# Patient Record
Sex: MALE | Race: WHITE | RURAL
[De-identification: names, ages, dates, MRNs, and addresses within clinical notes are randomized per-mention and may not be internally consistent; named-entity substitution may affect disease eponyms.]

---

## 2021-08-28 ENCOUNTER — APPOINTMENT (OUTPATIENT)
Dept: GENERAL RADIOLOGY | Facility: HOSPITAL | Age: 51
End: 2021-08-28

## 2021-08-28 ENCOUNTER — HOSPITAL ENCOUNTER (EMERGENCY)
Facility: HOSPITAL | Age: 51
Discharge: HOME | End: 2021-08-29
Attending: EMERGENCY MEDICINE

## 2021-08-28 DIAGNOSIS — U07.1 COVID-19: Primary | ICD-10-CM

## 2021-08-28 DIAGNOSIS — J44.1 COPD EXACERBATION (HCC): ICD-10-CM

## 2021-08-28 DIAGNOSIS — R05.9 COUGH: ICD-10-CM

## 2021-08-28 DIAGNOSIS — S29.011A INTERCOSTAL MUSCLE STRAIN, INITIAL ENCOUNTER: ICD-10-CM

## 2021-08-28 LAB
A/G RATIO: 1.2 (ref 0.8–2)
ALBUMIN SERPL-MCNC: 4.3 G/DL (ref 3.4–4.8)
ALP BLD-CCNC: 88 U/L (ref 25–100)
ALT SERPL-CCNC: 44 U/L (ref 4–36)
ANION GAP SERPL CALCULATED.3IONS-SCNC: 14 MMOL/L (ref 3–16)
AST SERPL-CCNC: 52 U/L (ref 8–33)
BASOPHILS ABSOLUTE: 0.1 K/UL (ref 0–0.1)
BASOPHILS RELATIVE PERCENT: 1 %
BILIRUB SERPL-MCNC: 0.6 MG/DL (ref 0.3–1.2)
BUN BLDV-MCNC: 6 MG/DL (ref 6–20)
CALCIUM SERPL-MCNC: 8.9 MG/DL (ref 8.5–10.5)
CHLORIDE BLD-SCNC: 96 MMOL/L (ref 98–107)
CO2: 24 MMOL/L (ref 20–30)
CREAT SERPL-MCNC: 0.8 MG/DL (ref 0.4–1.2)
EOSINOPHILS ABSOLUTE: 0.2 K/UL (ref 0–0.4)
EOSINOPHILS RELATIVE PERCENT: 3.1 %
GFR AFRICAN AMERICAN: >59
GFR NON-AFRICAN AMERICAN: >59
GLOBULIN: 3.5 G/DL
GLUCOSE BLD-MCNC: 313 MG/DL (ref 74–106)
HCT VFR BLD CALC: 46.3 % (ref 40–54)
HEMOGLOBIN: 16.1 G/DL (ref 13–18)
IMMATURE GRANULOCYTES #: 0 K/UL
IMMATURE GRANULOCYTES %: 0.3 % (ref 0–5)
LYMPHOCYTES ABSOLUTE: 0.6 K/UL (ref 1.5–4)
LYMPHOCYTES RELATIVE PERCENT: 10.4 %
MCH RBC QN AUTO: 33.4 PG (ref 27–32)
MCHC RBC AUTO-ENTMCNC: 34.8 G/DL (ref 31–35)
MCV RBC AUTO: 96.1 FL (ref 80–100)
MONOCYTES ABSOLUTE: 0.7 K/UL (ref 0.2–0.8)
MONOCYTES RELATIVE PERCENT: 11.1 %
NEUTROPHILS ABSOLUTE: 4.5 K/UL (ref 2–7.5)
NEUTROPHILS RELATIVE PERCENT: 74.1 %
PDW BLD-RTO: 11.1 % (ref 11–16)
PLATELET # BLD: 175 K/UL (ref 150–400)
PMV BLD AUTO: 10 FL (ref 6–10)
POTASSIUM SERPL-SCNC: 4.2 MMOL/L (ref 3.4–5.1)
RBC # BLD: 4.82 M/UL (ref 4.5–6)
SARS-COV-2, NAAT: DETECTED
SODIUM BLD-SCNC: 134 MMOL/L (ref 136–145)
TOTAL PROTEIN: 7.8 G/DL (ref 6.4–8.3)
TROPONIN: <0.3 NG/ML
WBC # BLD: 6.1 K/UL (ref 4–11)

## 2021-08-28 PROCEDURE — 87635 SARS-COV-2 COVID-19 AMP PRB: CPT

## 2021-08-28 PROCEDURE — 6360000002 HC RX W HCPCS: Performed by: EMERGENCY MEDICINE

## 2021-08-28 PROCEDURE — 85025 COMPLETE CBC W/AUTO DIFF WBC: CPT

## 2021-08-28 PROCEDURE — 36415 COLL VENOUS BLD VENIPUNCTURE: CPT

## 2021-08-28 PROCEDURE — 80053 COMPREHEN METABOLIC PANEL: CPT

## 2021-08-28 PROCEDURE — 96374 THER/PROPH/DIAG INJ IV PUSH: CPT

## 2021-08-28 PROCEDURE — 71045 X-RAY EXAM CHEST 1 VIEW: CPT

## 2021-08-28 PROCEDURE — 99283 EMERGENCY DEPT VISIT LOW MDM: CPT

## 2021-08-28 PROCEDURE — 84484 ASSAY OF TROPONIN QUANT: CPT

## 2021-08-28 PROCEDURE — 93005 ELECTROCARDIOGRAM TRACING: CPT

## 2021-08-28 PROCEDURE — 6370000000 HC RX 637 (ALT 250 FOR IP): Performed by: EMERGENCY MEDICINE

## 2021-08-28 RX ORDER — PREDNISONE 10 MG/1
TABLET ORAL
Status: DISCONTINUED
Start: 2021-08-28 | End: 2021-08-29 | Stop reason: HOSPADM

## 2021-08-28 RX ORDER — BENZONATATE 100 MG/1
200 CAPSULE ORAL 3 TIMES DAILY PRN
Status: DISCONTINUED | OUTPATIENT
Start: 2021-08-28 | End: 2021-08-29 | Stop reason: HOSPADM

## 2021-08-28 RX ORDER — BENZONATATE 200 MG/1
200 CAPSULE ORAL 3 TIMES DAILY PRN
Qty: 21 CAPSULE | Refills: 0 | Status: SHIPPED | OUTPATIENT
Start: 2021-08-28 | End: 2021-09-04

## 2021-08-28 RX ORDER — PREDNISONE 50 MG/1
TABLET ORAL
Status: DISCONTINUED
Start: 2021-08-28 | End: 2021-08-29 | Stop reason: HOSPADM

## 2021-08-28 RX ORDER — BENZONATATE 100 MG/1
200 CAPSULE ORAL ONCE
Status: COMPLETED | OUTPATIENT
Start: 2021-08-28 | End: 2021-08-28

## 2021-08-28 RX ORDER — PREDNISONE 20 MG/1
60 TABLET ORAL DAILY
Qty: 15 TABLET | Refills: 0 | Status: SHIPPED | OUTPATIENT
Start: 2021-08-28 | End: 2021-09-02

## 2021-08-28 RX ORDER — ALBUTEROL SULFATE 90 UG/1
2 AEROSOL, METERED RESPIRATORY (INHALATION) 4 TIMES DAILY PRN
Qty: 1 INHALER | Refills: 0 | Status: SHIPPED | OUTPATIENT
Start: 2021-08-28

## 2021-08-28 RX ORDER — ALBUTEROL SULFATE 90 UG/1
2 AEROSOL, METERED RESPIRATORY (INHALATION) EVERY 6 HOURS PRN
Status: DISCONTINUED | OUTPATIENT
Start: 2021-08-28 | End: 2021-08-29 | Stop reason: HOSPADM

## 2021-08-28 RX ORDER — METHYLPREDNISOLONE SODIUM SUCCINATE 125 MG/2ML
125 INJECTION, POWDER, LYOPHILIZED, FOR SOLUTION INTRAMUSCULAR; INTRAVENOUS ONCE
Status: COMPLETED | OUTPATIENT
Start: 2021-08-28 | End: 2021-08-28

## 2021-08-28 RX ADMIN — BENZONATATE 200 MG: 100 CAPSULE ORAL at 22:31

## 2021-08-28 RX ADMIN — ALBUTEROL SULFATE 2 PUFF: 90 AEROSOL, METERED RESPIRATORY (INHALATION) at 22:30

## 2021-08-28 RX ADMIN — BENZONATATE 200 MG: 100 CAPSULE ORAL at 22:27

## 2021-08-28 RX ADMIN — METHYLPREDNISOLONE SODIUM SUCCINATE 125 MG: 125 INJECTION, POWDER, FOR SOLUTION INTRAMUSCULAR; INTRAVENOUS at 22:23

## 2021-08-28 ASSESSMENT — PAIN DESCRIPTION - PAIN TYPE: TYPE: ACUTE PAIN

## 2021-08-28 ASSESSMENT — PAIN SCALES - GENERAL: PAINLEVEL_OUTOF10: 2

## 2021-08-28 ASSESSMENT — PAIN DESCRIPTION - LOCATION: LOCATION: CHEST

## 2021-08-28 NOTE — ED TRIAGE NOTES
Pt states that he started having cough and nasal congestion three days ago. Pts states that his chest started hurting because of the coughing.

## 2021-08-29 VITALS
SYSTOLIC BLOOD PRESSURE: 160 MMHG | WEIGHT: 190 LBS | TEMPERATURE: 99.6 F | DIASTOLIC BLOOD PRESSURE: 94 MMHG | OXYGEN SATURATION: 96 % | HEART RATE: 82 BPM | RESPIRATION RATE: 18 BRPM | HEIGHT: 70 IN | BODY MASS INDEX: 27.2 KG/M2

## 2021-08-29 PROCEDURE — 6370000000 HC RX 637 (ALT 250 FOR IP): Performed by: EMERGENCY MEDICINE

## 2021-08-29 RX ADMIN — PREDNISONE 60 MG: 50 TABLET ORAL at 00:03

## 2021-08-29 NOTE — ED PROVIDER NOTES
[address] ENCOUNTER      Pt Name: Fide New  MRN: [id number]  [place]: 1970  Date of evaluation: 8/28/2021  Provider: Madonna Varner MD    [address] [place] [address]       Chief Complaint   Patient presents with    Cough    Chest Pain         HISTORY OF PRESENT ILLNESS  (Location/Symptom, Timing/Onset, Context/Setting, Quality, Duration, Modifying Factors, Severity.)   Fide New is a 52 y.o. male who presents to the emergency department with several complaints. Patient states that for the last several days he has had nonproductive cough with some mild body aches. Patient states that he works in [employer] is not sure if he had been exposed to COVID-19 or not. Patient reports a history of COPD and still continues to smoke. Patient states that he has been out of his inhaler and will buy them from people when available because he does not go to his primary physician. Patient states that he has not been using an inhaler recently. Patient states that the symptoms continued to progress and he was concerned so he presented to the emergency department for further evaluation. Patient states that he has pain in his rib muscles secondary to chronic cough. Patient also states that he has noted some increased wheezing. Last cigarette was prior to arrival.  Patient was resting comfortably in the room in no acute distress conversing in full sentences nontoxic      Nursing notes were reviewed.     REVIEW OFSYSTEMS    (2-9 systems for level 4, 10 or more for level 5)   ROS:  General: Body aches  Cardiovascular:  No chest pain, no palpitations  Respiratory: Cough and wheezing  Gastrointestinal:  No pain, no nausea, no vomiting, no diarrhea  Musculoskeletal:  No muscle pain, no joint pain  Skin:  No rash, no easy bruising  Neurologic:  No speech problems, no headache, no extremity weakness  Psychiatric:  No anxiety  Genitourinary:  No dysuria, no hematuria    Except as noted above the remainder of the review of systems was reviewed and negative. PAST MEDICAL HISTORY     Past Medical History:   Diagnosis Date    Asthma     [facility]ch[facility] [address]     Diabetes mellitus ([facility]     Hypertension          SURGICAL HISTORY       Past Surgical History:   Procedure Laterality Date    HERNIA REPAIR           CURRENT MEDICATIONS       Previous Medications    No medications on file       ALLERGIES     Patient has no known allergies. FAMILY HISTORY     History reviewed. No pertinent family history. SOCIAL HISTORY       Social History     Socioeconomic History    Marital status: Single     Spouse name: None    Number of children: None    Years of education: None    Highest education level: None   Occupational History    None   Tobacco Use    Smoking status: Current Every Day Smoker     Packs/day: 0.50     Types: Cigarettes    Smokeless tobacco: Never Used   Substance and Sexual Activity    Alcohol use: Never    Drug use: None    Sexual activity: None   Other Topics Concern    None   Social History Narrative    None     Social Determinants of Health     Financial Resource Strain:     Difficulty of Paying Living Expenses:    Food Insecurity:     Worried About Running Out of Food in the Last Year:     Ran Out of Food in the Last Year:    Transportation Needs:     Lack of Transportation (Medical):      Lack of Transportation (Non-Medical):    Physical Activity:     Days of Exercise per Week:     Minutes of Exercise per Session:    Stress:     Feeling of Stress :    Social Connections:     Frequency of Communication with Friends and Family:     Frequency of Social Gatherings with Friends and Family:     Attends Holiness Services:     Active Member of Clubs or Organizations:     Attends Club or Organization Meetings:     Marital Status:    Intimate Partner Violence:     Fear of Current or Ex-Partner:     Emotionally Abused:     Physically Abused:     Sexually Abused:          PHYSICAL EXAM    (up to 7 for level 4, 8 or more for level 5)     ED Triage Vitals [08/28/21 1946]   BP Temp Temp Source Pulse Resp SpO2 Height Weight   (!) 151/104 99.6 °F (37.6 °C) Oral 105 18 98 % 5' 10\" (1.778 m) 190 lb (86.2 kg)       Physical Exam  General :Patient is awake, alert, oriented, in no acute distress, nontoxic appearing  HEENT: Pupils are equally round and reactive to light, EOMI, conjunctivae clear. Oral mucosa is moist, no exudate. Uvula is midline  Neck: Neck is supple, full range of motion, trachea midline  Cardiac: Heart regular rate, rhythm, no murmurs, rubs, or gallops  Lungs: Regular respiratory rate. Bilateral wheezes. Chest wall: There is bilateral intercostal rib muscle tenderness on palpation recreates his symptoms of brought the patient to the emergency department  Abdomen: Abdomen is soft, nontender, nondistended. There is no firm or pulsatile masses, no rebound rigidity or guarding. Musculoskeletal: 5 out of 5 strength in all 4 extremities. No focal muscle deficits are appreciated  Neuro: Motor intact, sensory intact, level of consciousness is normal, cerebellar function is normal, reflexes are grossly normal. No evidence of incontinence or loss of bowel or bladder function, no saddle anesthesia noted   Dermatology: Skin is warm and dry  Psych: Mentation is grossly normal, cognition is grossly normal. Affect is appropriate.       DIAGNOSTIC RESULTS     EKG: All EKG's are interpreted by the Emergency Department Physician who either signs or Co-signs this chart in the [address] a cardiologist.    The EKG interpreted by me shows sinus tachycardia rate of 107 per EDMD.    RADIOLOGY:   Non-plain film images such as CT, Ultrasound and MRI are read by the radiologist. Plain radiographic images are visualized and preliminarily interpreted by the emergency physician with the below findings:      ? Radiologist's Report Reviewed:  XR CHEST PORTABLE   Final Result      1. Multifocal patchy airspace disease and small bilateral pleural effusions.             ED BEDSIDE ULTRASOUND:   Performed by ED Physician - none    LABS:    I have reviewed and interpreted all of the currently available lab results from this visit (ifapplicable):  Results for orders placed or performed during the hospital encounter of 08/28/21   COVID-19, Rapid    Specimen: Nasopharyngeal Swab   Result Value Ref Range    SARS-CoV-2, NAAT DETECTED (AA) Not Detected   CBC Auto Differential   Result Value Ref Range    WBC 6.1 4.0 - 11.0 K/uL    RBC 4.82 4.50 - 6.00 M/uL    Hemoglobin 16.1 13.0 - 18.0 g/dL    Hematocrit 46.3 40.0 - 54.0 %    MCV 96.1 80.0 - 100.0 fL    MCH 33.4 (H) 27.0 - 32.0 pg    MCHC 34.8 31.0 - 35.0 g/dL    RDW 11.1 11.0 - 16.0 %    Platelets 831 408 - 956 K/uL    MPV 10.0 6.0 - 10.0 fL    Neutrophils % 74.1 %    Immature Granulocytes % 0.3 0.0 - 5.0 %    Lymphocytes % 10.4 %    Monocytes % 11.1 %    Eosinophils % 3.1 %    Basophils % 1.0 %    Neutrophils Absolute 4.5 2.0 - 7.5 K/uL    Immature Granulocytes # 0.0 K/uL    Lymphocytes Absolute 0.6 (L) 1.5 - 4.0 K/uL    Monocytes Absolute 0.7 0.2 - 0.8 K/uL    Eosinophils Absolute 0.2 0.0 - 0.4 K/uL    Basophils Absolute 0.1 0.0 - 0.1 K/uL   Comprehensive Metabolic Panel   Result Value Ref Range    Sodium 134 (L) 136 - 145 mmol/L    Potassium 4.2 3.4 - 5.1 mmol/L    Chloride 96 (L) 98 - 107 mmol/L    CO2 24 20 - 30 mmol/L    Anion Gap 14 3 - 16    Glucose 313 (H) 74 - 106 mg/dL    BUN 6 6 - 20 mg/dL    CREATININE 0.8 0.4 - 1.2 mg/dL    GFR Non-African American >59 >59    GFR African American >59 >59    Calcium 8.9 8.5 - 10.5 mg/dL    Total Protein 7.8 6.4 - 8.3 g/dL    Albumin 4.3 3.4 - 4.8 g/dL    Albumin/Globulin Ratio 1.2 0.8 - 2.0    Total Bilirubin 0.6 0.3 - 1.2 mg/dL    Alkaline Phosphatase 88 25 - 100 U/L    ALT 44 (H) 4 - 36 U/L    AST 52 (H) 8 - 33 U/L    Globulin 3.5 g/dL   Troponin Result Value Ref Range    Troponin <0.30 <0.30 ng/mL        All other labs were within normal range or not returned as of this dictation. EMERGENCY DEPARTMENT COURSE and DIFFERENTIAL DIAGNOSIS/MDM:   Vitals:    Vitals:    08/28/21 2230 08/28/21 2245 08/28/21 2300 08/28/21 2315   BP:       Pulse: 102 100 87 89   Resp:       Temp:       TempSrc:       SpO2: 95% 97% 97% 95%   Weight:       Height:           MEDICATIONS ADMINISTERED IN ED:  Medications   predniSONE (DELTASONE) tablet 60 mg (has no administration in time range)   albuterol sulfate  (90 Base) MCG/ACT inhaler 2 puff (2 puffs Inhalation Given 8/28/21 2230)   benzonatate (TESSALON) capsule 200 mg (200 mg Oral Given 8/28/21 2231)   predniSONE (DELTASONE) 50 MG tablet (has no administration in time range)   predniSONE (DELTASONE) 10 MG tablet (has no administration in time range)   methylPREDNISolone sodium (SOLU-MEDROL) injection 125 mg (125 mg IntraVENous Given 8/28/21 2223)   benzonatate (TESSALON) capsule 200 mg (200 mg Oral Given 8/28/21 2227)       Patient was placed examination room in which time after exam was performed IV was obtained and labs were drawn. Patient was given Solu-Medrol 125 mg IV along with an MDI per respiratory secondary to COPD exacerbation with wheezes. EKG revealed sinus tachycardia rate of 107 per EDMD.  Review of patient's labs reveal a normal CMP. Troponin was negative. Glucose is 313 which patient states is baseline for his diabetes. White count was 6.1 thousand. Covid test was positive. Chest x-ray revealed patchy airspace disease with small bilateral pleural effusion. Patient was given a Tessalon 200 mg Perle for his cough. Results reviewed with patient who was instructed need to refrain from smoking, to use his inhaler and medications as prescribed. Patient was also instructed to quarantine himself for 10 days.   He is also instructed increase his clear liquid diet advance as tolerated and follow-up with his primary physician. Patient was appreciative the care and agrees with the plan above    The patient will follow-up with their PCP in 1-2 days for reevaluation. If the patient or family members have anyfurther concerns or any worsening symptoms they will return to the ED for reevaluation. CONSULTS:  None    PROCEDURES:  Procedures    CRITICAL CARE TIME    Total Critical Care time was 0 minutes, excluding separately reportable procedures. There was a high probability of clinically significant/life threatening deterioration in the patient's condition which required my urgent intervention. FINAL IMPRESSION      1. COVID-19 Stable   2. Cough Stable   3. Intercostal muscle strain, initial encounter Stable   4. COPD exacerbation ([facility] [address] Stable         DISPOSITION/PLAN   DISPOSITION        PATIENT REFERRED TO:  [facility] Emergency Department  [facility] [address] [facility]  [phone]  In 2 days  If symptoms worsen      Follow-up primary physician 1 to 2 days for reexamination  Schedule an appointment as soon as possible for a visit in 2 days        DISCHARGE MEDICATIONS:  New Prescriptions    ALBUTEROL SULFATE HFA (VENTOLIN HFA) 108 (90 BASE) MCG/ACT INHALER    Inhale 2 puffs into the lungs 4 times daily as needed for Wheezing    BENZONATATE (TESSALON) 200 MG CAPSULE    Take 1 capsule by mouth 3 times daily as needed for Cough    PREDNISONE (DELTASONE) 20 MG TABLET    Take 3 tablets by mouth daily for 5 days       Comment: Please note this report has been produced using speech recognition software and may contain errorsrelated to that system including errors in grammar, punctuation, and spelling, as well as words and phrases that may be inappropriate. If there are any questions or concerns please feel free to contact the dictating providerfor clarification.     Gregor Thomason MD  Attending Emergency Physician              Gregor Thomason MD  08/28/21 5488

## 2021-08-29 NOTE — ED NOTES
Discharge instructions reviewed and medications discussed with verbalized understanding from patient. Patient had no further questions or concerns.         Magalys Covarrubias RN  08/29/21 2547

## 2022-07-03 ENCOUNTER — APPOINTMENT (OUTPATIENT)
Dept: GENERAL RADIOLOGY | Facility: HOSPITAL | Age: 52
End: 2022-07-03

## 2022-07-03 ENCOUNTER — HOSPITAL ENCOUNTER (EMERGENCY)
Facility: HOSPITAL | Age: 52
Discharge: HOME | End: 2022-07-03
Attending: EMERGENCY MEDICINE

## 2022-07-03 VITALS
HEIGHT: 70 IN | OXYGEN SATURATION: 98 % | WEIGHT: 190 LBS | SYSTOLIC BLOOD PRESSURE: 163 MMHG | TEMPERATURE: 99.8 F | RESPIRATION RATE: 18 BRPM | HEART RATE: 114 BPM | DIASTOLIC BLOOD PRESSURE: 93 MMHG | BODY MASS INDEX: 27.2 KG/M2

## 2022-07-03 DIAGNOSIS — U07.1 UPPER RESPIRATORY TRACT INFECTION DUE TO COVID-19 VIRUS: Primary | ICD-10-CM

## 2022-07-03 DIAGNOSIS — J06.9 UPPER RESPIRATORY TRACT INFECTION DUE TO COVID-19 VIRUS: Primary | ICD-10-CM

## 2022-07-03 LAB
A/G RATIO: 1.3 (ref 0.8–2)
ALBUMIN SERPL-MCNC: 4.2 G/DL (ref 3.4–4.8)
ALP BLD-CCNC: 90 U/L (ref 25–100)
ALT SERPL-CCNC: 68 U/L (ref 4–36)
ANION GAP SERPL CALCULATED.3IONS-SCNC: 15 MMOL/L (ref 3–16)
AST SERPL-CCNC: 136 U/L (ref 8–33)
BASOPHILS ABSOLUTE: 0.1 K/UL (ref 0–0.1)
BASOPHILS RELATIVE PERCENT: 1 %
BILIRUB SERPL-MCNC: 0.6 MG/DL (ref 0.3–1.2)
BUN BLDV-MCNC: 8 MG/DL (ref 6–20)
C-REACTIVE PROTEIN: 8.4 MG/L (ref 0–5.1)
CALCIUM SERPL-MCNC: 9 MG/DL (ref 8.5–10.5)
CHLORIDE BLD-SCNC: 95 MMOL/L (ref 98–107)
CO2: 21 MMOL/L (ref 20–30)
CREAT SERPL-MCNC: 0.8 MG/DL (ref 0.4–1.2)
EOSINOPHILS ABSOLUTE: 0.3 K/UL (ref 0–0.4)
EOSINOPHILS RELATIVE PERCENT: 4.5 %
GFR AFRICAN AMERICAN: >59
GFR NON-AFRICAN AMERICAN: >59
GLOBULIN: 3.3 G/DL
GLUCOSE BLD-MCNC: 258 MG/DL (ref 74–106)
HCT VFR BLD CALC: 43.2 % (ref 40–54)
HEMOGLOBIN: 15.1 G/DL (ref 13–18)
IMMATURE GRANULOCYTES #: 0.1 K/UL
IMMATURE GRANULOCYTES %: 0.7 % (ref 0–5)
LYMPHOCYTES ABSOLUTE: 0.4 K/UL (ref 1.5–4)
LYMPHOCYTES RELATIVE PERCENT: 5.3 %
MCH RBC QN AUTO: 33.3 PG (ref 27–32)
MCHC RBC AUTO-ENTMCNC: 35 G/DL (ref 31–35)
MCV RBC AUTO: 95.4 FL (ref 80–100)
MONOCYTES ABSOLUTE: 0.6 K/UL (ref 0.2–0.8)
MONOCYTES RELATIVE PERCENT: 8.6 %
NEUTROPHILS ABSOLUTE: 5.5 K/UL (ref 2–7.5)
NEUTROPHILS RELATIVE PERCENT: 79.9 %
PDW BLD-RTO: 10.7 % (ref 11–16)
PLATELET # BLD: 185 K/UL (ref 150–400)
PMV BLD AUTO: 9.6 FL (ref 6–10)
POTASSIUM REFLEX MAGNESIUM: 4.1 MMOL/L (ref 3.4–5.1)
RBC # BLD: 4.53 M/UL (ref 4.5–6)
SARS-COV-2, NAAT: DETECTED
SODIUM BLD-SCNC: 131 MMOL/L (ref 136–145)
TOTAL PROTEIN: 7.5 G/DL (ref 6.4–8.3)
WBC # BLD: 6.8 K/UL (ref 4–11)

## 2022-07-03 PROCEDURE — 36415 COLL VENOUS BLD VENIPUNCTURE: CPT

## 2022-07-03 PROCEDURE — 86140 C-REACTIVE PROTEIN: CPT

## 2022-07-03 PROCEDURE — 85025 COMPLETE CBC W/AUTO DIFF WBC: CPT

## 2022-07-03 PROCEDURE — 80053 COMPREHEN METABOLIC PANEL: CPT

## 2022-07-03 PROCEDURE — 99284 EMERGENCY DEPT VISIT MOD MDM: CPT

## 2022-07-03 PROCEDURE — 71045 X-RAY EXAM CHEST 1 VIEW: CPT

## 2022-07-03 PROCEDURE — 87635 SARS-COV-2 COVID-19 AMP PRB: CPT

## 2022-07-03 RX ORDER — ACETAMINOPHEN 325 MG/1
650 TABLET ORAL ONCE
Status: DISCONTINUED | OUTPATIENT
Start: 2022-07-03 | End: 2022-07-03 | Stop reason: HOSPADM

## 2022-07-03 RX ORDER — 0.9 % SODIUM CHLORIDE 0.9 %
1000 INTRAVENOUS SOLUTION INTRAVENOUS ONCE
Status: DISCONTINUED | OUTPATIENT
Start: 2022-07-03 | End: 2022-07-03 | Stop reason: HOSPADM

## 2022-07-03 RX ORDER — ONDANSETRON 2 MG/ML
4 INJECTION INTRAMUSCULAR; INTRAVENOUS ONCE
Status: DISCONTINUED | OUTPATIENT
Start: 2022-07-03 | End: 2022-07-03 | Stop reason: HOSPADM

## 2022-07-03 NOTE — ED TRIAGE NOTES
Pt tested positive for covid on an at home test. Pt states that he has been vomiting and had some diarrhea.

## 2022-07-03 NOTE — ED NOTES
Discharge instructions reviewed and medications discussed with verbalized understanding from patient. Patient had no further questions or concerns.         Ryan Hart RN  07/03/22 [id number]

## 2022-07-03 NOTE — Clinical Note
Silva Sosa was seen and treated in our emergency department on 7/3/2022. He may return to work on 07/06/2022. After return to work he must wear his mask at all times through July 11. If you have any questions or concerns, please don't hesitate to call.       Jie Izaguirre MD

## 2022-07-03 NOTE — ED NOTES
Pt refuses IV and medications at this time. MD made aware. Pt did allow nurse to draw labs.       Nia Cabrera RN  07/03/22 7505

## 2022-07-03 NOTE — ED PROVIDER NOTES
[address] [facility] ENCOUNTER      Pt Name: Moody Barraza  MRN: [id number]  YOB: 1970  Date of evaluation: 7/3/2022  Provider: Nate Martínez MD    [address] [place] [address]       Chief Complaint   Patient presents with    Positive For Covid-19    Headache    Emesis         HISTORY OF PRESENT ILLNESS  (Location/Symptom, Timing/Onset, Context/Setting, Quality, Duration, Modifying Factors, Severity.)   Moody Barraza is a 54 y.o. male who presents to the emergency department complaining that he tested positive for COVID at home 3 times but his work will not accept this as a work excuse. He was notified by his son that he was exposed to [employer] 3 days ago when he was playing with his grandson about 2 days ago he became symptomatic with vomiting diarrhea headache soreness of his eyes coughing so hard that his nose is blood he is coughing up a greenish sputum. Denies shortness of breath or chest pain of note he does have a history of COPD hypertension and diabetes although he states he does not check his sugar and is not on any medication. Nursing notes were reviewed. REVIEW OFSYSTEMS    (2-9 systems for level 4, 10 or more for level 5)   ROS:  General:  No fevers, no chills, no weakness  Cardiovascular:  No chest pain, no palpitations  Respiratory:  No shortness of breath,+ cough, no wheezing  Gastrointestinal:  No pain,+ nausea, + vomiting, + diarrhea  Musculoskeletal:  No muscle pain, no joint pain  Skin:  No rash, no easy bruising  Neurologic:  No speech problems, no headache, no extremity weakness  Psychiatric:  No anxiety  Genitourinary:  No dysuria, no hematuria    Except as noted above the remainder of the review of systems was reviewed and negative.        PAST MEDICAL HISTORY     Past Medical History:   Diagnosis Date    Asthma     [facility]     Diabetes mellitus ([facility]     Hypertension SURGICAL HISTORY       Past Surgical History:   Procedure Laterality Date    HERNIA REPAIR      TONSILLECTOMY           CURRENT MEDICATIONS       Previous Medications    ALBUTEROL SULFATE HFA (VENTOLIN HFA) 108 (90 BASE) MCG/ACT INHALER    Inhale 2 puffs into the lungs 4 times daily as needed for Wheezing    ALBUTEROL-IPRATROPIUM (COMBIVENT)  MCG/ACT INHALER    Inhale 2 puffs into the lungs every 6 hours as needed for Wheezing    BUDESONIDE-FORMOTEROL (SYMBICORT) 80-4.5 MCG/ACT AERO    Inhale 2 puffs into the lungs 2 times daily    GUAIFENESIN 400 MG TABLET    Take 400 mg by mouth 4 times daily as needed for Cough    IBUPROFEN (ADVIL;MOTRIN) 600 MG TABLET    Take 1 tablet by mouth every 6 hours as needed for Pain    IBUPROFEN (ADVIL;MOTRIN) 800 MG TABLET    Take 800 mg by mouth every 6 hours as needed for Pain    IPRATROPIUM-ALBUTEROL (DUONEB) 0.5-2.5 (3) MG/3ML SOLN NEBULIZER SOLUTION    Inhale 3 mLs into the lungs every 4 hours    ONDANSETRON (ZOFRAN ODT) 4 MG DISINTEGRATING TABLET    Take 1 tablet by mouth every 8 hours as needed for Nausea       ALLERGIES     Patient has no known allergies. FAMILY HISTORY     History reviewed. No pertinent family history.        SOCIAL HISTORY       Social History     Socioeconomic History    Marital status: Single     Spouse name: None    Number of children: None    Years of education: None    Highest education level: None   Occupational History    None   Tobacco Use    Smoking status: Current Every Day Smoker     Packs/day: 0.50     Years: 20.00     Pack years: 10.00     Types: Cigarettes    Smokeless tobacco: Never Used   Substance and Sexual Activity    Alcohol use: Never    Drug use: None    Sexual activity: None   Other Topics Concern    None   Social History Narrative    ** Merged History Encounter **          Social Determinants of Health     Financial Resource Strain:     Difficulty of Paying Living Expenses: Not on file   Food Insecurity:  Worried About Running Out of Food in the Last Year: Not on file    Ubaldo of Food in the Last Year: Not on file   Transportation Needs:     Lack of Transportation (Medical): Not on file    Lack of Transportation (Non-Medical): Not on file   Physical Activity:     Days of Exercise per Week: Not on file    Minutes of Exercise per Session: Not on file   Stress:     Feeling of Stress : Not on file   Social Connections:     Frequency of Communication with Friends and Family: Not on file    Frequency of Social Gatherings with Friends and Family: Not on file    Attends Religion Services: Not on file    Active Member of [address] [place] [address] [employer] or Organizations: Not on file    Attends Club or Organization Meetings: Not on file    Marital Status: Not on file   Intimate Partner Violence:     Fear of Current or Ex-Partner: Not on file    Emotionally Abused: Not on file    Physically Abused: Not on file    Sexually Abused: Not on file   Housing Stability:     Unable to Pay for Housing in the Last Year: Not on file    Number of [place] in the Last Year: Not on file    Unstable Housing in the Last Year: Not on file         PHYSICAL EXAM    (up to 7 for level 4, 8 or more for level 5)     ED Triage Vitals [07/03/22 1403]   BP Temp Temp Source Heart Rate Resp SpO2 Height Weight   (!) 163/93 99.8 °F (37.7 °C) Oral (!) 114 18 98 % 5' 10\" (1.778 m) 190 lb (86.2 kg)       Physical Exam  General :Patient is awake, alert, oriented, in no acute distress, nontoxic appearing  HEENT: Pupils are equally round and reactive to light, EOMI, conjunctivae clear. Neck: Neck is supple, full range of motion, trachea midline  Cardiac: Heart tachycardic rate, rhythm, no murmurs, rubs, or gallops  Lungs: Lungs are clear to auscultation, there is no wheezing, rhonchi, or rales. There is no use of accessory muscles. Chest wall:  There is no tenderness to palpation over the chest wall or over ribs  Abdomen: Abdomen is soft, nontender, nondistended. There is no firm or pulsatile masses, no rebound rigidity or guarding. Musculoskeletal: 5 out of 5 strength in all 4 extremities. No focal muscle deficits are appreciated plus edema both legs  Neuro: Motor intact, sensory intact, level of consciousness is normal,   Dermatology: Skin is warm and dry  Psych: Mentation is grossly normal, cognition is grossly normal. Affect is appropriate. DIAGNOSTIC RESULTS     EKG: All EKG's are interpreted by the Emergency Department Physician who either signs or Co-signs this chart in the [address] a cardiologist.      RADIOLOGY:   Non-plain film images such as CT, Ultrasound and MRI are read by the radiologist. Plain radiographic images are visualized and preliminarily interpreted by the emergency physician with the below findings:      ? Radiologist's Report Reviewed:  XR CHEST PORTABLE   Final Result   Stable scarring and pleural fluid right lung.             ED BEDSIDE ULTRASOUND:   Performed by ED Physician - none    LABS:    I have reviewed and interpreted all of the currently available lab results from this visit (ifapplicable):  Results for orders placed or performed during the hospital encounter of 07/03/22   COVID-19, Rapid    Specimen: Nasopharyngeal Swab   Result Value Ref Range    SARS-CoV-2, NAAT DETECTED (AA) Not Detected   CBC with Auto Differential   Result Value Ref Range    WBC 6.8 4.0 - 11.0 K/uL    RBC 4.53 4.50 - 6.00 M/uL    Hemoglobin 15.1 13.0 - 18.0 g/dL    Hematocrit 43.2 40.0 - 54.0 %    MCV 95.4 80.0 - 100.0 fL    MCH 33.3 (H) 27.0 - 32.0 pg    MCHC 35.0 31.0 - 35.0 g/dL    RDW 10.7 (L) 11.0 - 16.0 %    Platelets 084 246 - 929 K/uL    MPV 9.6 6.0 - 10.0 fL    Neutrophils % 79.9 %    Immature Granulocytes % 0.7 0.0 - 5.0 %    Lymphocytes % 5.3 %    Monocytes % 8.6 %    Eosinophils % 4.5 %    Basophils % 1.0 %    Neutrophils Absolute 5.5 2.0 - 7.5 K/uL    Immature Granulocytes # 0.1 K/uL    Lymphocytes Absolute 0.4 (L) 1.5 - 4.0 K/uL Monocytes Absolute 0.6 0.2 - 0.8 K/uL    Eosinophils Absolute 0.3 0.0 - 0.4 K/uL    Basophils Absolute 0.1 0.0 - 0.1 K/uL   Comprehensive Metabolic Panel w/ Reflex to MG   Result Value Ref Range    Sodium 131 (L) 136 - 145 mmol/L    Potassium reflex Magnesium 4.1 3.4 - 5.1 mmol/L    Chloride 95 (L) 98 - 107 mmol/L    CO2 21 20 - 30 mmol/L    Anion Gap 15 3 - 16    Glucose 258 (H) 74 - 106 mg/dL    BUN 8 6 - 20 mg/dL    CREATININE 0.8 0.4 - 1.2 mg/dL    GFR Non-African American >59 >59    GFR African American >59 >59    Calcium 9.0 8.5 - 10.5 mg/dL    Total Protein 7.5 6.4 - 8.3 g/dL    Albumin 4.2 3.4 - 4.8 g/dL    Albumin/Globulin Ratio 1.3 0.8 - 2.0    Total Bilirubin 0.6 0.3 - 1.2 mg/dL    Alkaline Phosphatase 90 25 - 100 U/L    ALT 68 (H) 4 - 36 U/L     (H) 8 - 33 U/L    Globulin 3.3 Not Established g/dL   C-Reactive Protein   Result Value Ref Range    CRP 8.4 (H) 0.0 - 5.1 mg/L        All other labs were within normal range or not returned as of this dictation. EMERGENCY DEPARTMENT COURSE and DIFFERENTIAL DIAGNOSIS/MDM:   Vitals:    Vitals:    07/03/22 1403   BP: (!) 163/93   Pulse: (!) 114   Resp: 18   Temp: 99.8 °F (37.7 °C)   TempSrc: Oral   SpO2: 98%   Weight: 190 lb (86.2 kg)   Height: 5' 10\" (1.778 m)       MEDICATIONS ADMINISTERED IN ED:  Medications   0.9 % sodium chloride bolus (1,000 mLs IntraVENous Not Given 7/3/22 1431)   ondansetron (ZOFRAN) injection 4 mg (4 mg IntraVENous Not Given 7/3/22 1432)   acetaminophen (TYLENOL) tablet 650 mg (650 mg Oral Not Given 7/3/22 1432)       Patient has been stable his blood sugar is 258 but he says he always runs in that range despite the fact that he told me he does not check it at home and CRP is also up to 8.4 he is COVID-positive again. We will discharge the patient to home he is a candidate for bone.   We are but says he has no insurance or money so we are contacting [facility] to see if they have any kind of special program.  In the meantime we will go ahead and discharge him to home and have him follow-up with his family physician in 4 to 5 days. The patient will follow-up with their PCP in 1-2 days for reevaluation. If the patient or family members have anyfurther concerns or any worsening symptoms they will return to the ED for reevaluation. CONSULTS:  None    PROCEDURES:  Procedures    CRITICAL CARE TIME    Total Critical Care time was 0 minutes, excluding separately reportable procedures. There was a high probability of clinically significant/life threatening deterioration in the patient's condition which required my urgent intervention. FINAL IMPRESSION      1. Upper respiratory tract infection due to COVID-19 virus New Problem         DISPOSITION/PLAN   DISPOSITION    Stable discharge to home    PATIENT REFERRED TO:  Primary care    Schedule an appointment as soon as possible for a visit in 3 days        DISCHARGE MEDICATIONS:  New Prescriptions    No medications on file       Comment: Please note this report has been produced using speech recognition software and may contain errorsrelated to that system including errors in grammar, punctuation, and spelling, as well as words and phrases that may be inappropriate. If there are any questions or concerns please feel free to contact the dictating providerfor clarification.     Zhanna Umaña MD  Attending Emergency Physician              Zhanna Umaña MD  07/03/22 5780

## 2023-04-23 ENCOUNTER — HOSPITAL ENCOUNTER (INPATIENT)
Facility: HOSPITAL | Age: 53
LOS: 1 days | Discharge: HOME | DRG: 308 | End: 2023-04-25
Attending: EMERGENCY MEDICINE | Admitting: INTERNAL MEDICINE
Payer: COMMERCIAL

## 2023-04-23 ENCOUNTER — APPOINTMENT (OUTPATIENT)
Dept: GENERAL RADIOLOGY | Facility: HOSPITAL | Age: 53
DRG: 308 | End: 2023-04-23
Payer: COMMERCIAL

## 2023-04-23 ENCOUNTER — APPOINTMENT (OUTPATIENT)
Dept: CT IMAGING | Facility: HOSPITAL | Age: 53
DRG: 308 | End: 2023-04-23
Payer: COMMERCIAL

## 2023-04-23 DIAGNOSIS — I48.92 PAROXYSMAL ATRIAL FLUTTER (HCC): ICD-10-CM

## 2023-04-23 DIAGNOSIS — K85.20 ALCOHOL-INDUCED ACUTE PANCREATITIS, UNSPECIFIED COMPLICATION STATUS: Primary | ICD-10-CM

## 2023-04-23 DIAGNOSIS — I16.0 HYPERTENSIVE URGENCY: ICD-10-CM

## 2023-04-23 DIAGNOSIS — F10.29 ALCOHOL DEPENDENCE WITH UNSPECIFIED ALCOHOL-INDUCED DISORDER (HCC): ICD-10-CM

## 2023-04-23 DIAGNOSIS — F10.929 ACUTE ALCOHOLIC INTOXICATION WITH COMPLICATION (HCC): ICD-10-CM

## 2023-04-23 DIAGNOSIS — E11.69 TYPE 2 DIABETES MELLITUS WITH OTHER SPECIFIED COMPLICATION, WITHOUT LONG-TERM CURRENT USE OF INSULIN (HCC): ICD-10-CM

## 2023-04-23 DIAGNOSIS — I48.92 ATRIAL FLUTTER, UNSPECIFIED TYPE (HCC): ICD-10-CM

## 2023-04-23 DIAGNOSIS — Z86.69 HISTORY OF SLEEP APNEA: ICD-10-CM

## 2023-04-23 DIAGNOSIS — J44.9 CHRONIC OBSTRUCTIVE PULMONARY DISEASE, UNSPECIFIED COPD TYPE (HCC): ICD-10-CM

## 2023-04-23 LAB
ALBUMIN SERPL-MCNC: 4.4 G/DL (ref 3.4–4.8)
ALBUMIN/GLOB SERPL: 1.1 {RATIO} (ref 0.8–2)
ALP SERPL-CCNC: 92 U/L (ref 25–100)
ALT SERPL-CCNC: 71 U/L (ref 4–36)
AMPHET UR QL SCN: ABNORMAL
AMYLASE SERPL-CCNC: 183 U/L (ref 20–104)
ANION GAP SERPL CALCULATED.3IONS-SCNC: 19 MMOL/L (ref 3–16)
APAP SERPL-MCNC: <15 UG/ML (ref 10–30)
AST SERPL-CCNC: 72 U/L (ref 8–33)
BACTERIA URNS QL MICRO: ABNORMAL /HPF
BARBITURATES UR QL SCN: ABNORMAL
BASOPHILS # BLD: 0.1 K/UL (ref 0–0.1)
BASOPHILS NFR BLD: 1 %
BENZODIAZ UR QL SCN: ABNORMAL
BILIRUB SERPL-MCNC: 1.2 MG/DL (ref 0.3–1.2)
BILIRUB UR QL STRIP.AUTO: NEGATIVE
BUN SERPL-MCNC: 6 MG/DL (ref 6–20)
BUPRENORPHINE QUAL, URINE: ABNORMAL
CALCIUM SERPL-MCNC: 9.5 MG/DL (ref 8.5–10.5)
CANNABINOIDS UR QL SCN: POSITIVE
CHLORIDE SERPL-SCNC: 92 MMOL/L (ref 98–107)
CLARITY UR: CLEAR
CO2 SERPL-SCNC: 21 MMOL/L (ref 20–30)
COCAINE UR QL SCN: ABNORMAL
COLOR UR: YELLOW
CREAT SERPL-MCNC: 0.9 MG/DL (ref 0.4–1.2)
D DIMER PPP DDU-MCNC: 0.57 UG/ML FEU (ref 0–0.6)
DRUG SCREEN COMMENT UR-IMP: ABNORMAL
EOSINOPHIL # BLD: 0.1 K/UL (ref 0–0.4)
EOSINOPHIL NFR BLD: 2 %
EPI CELLS #/AREA URNS HPF: ABNORMAL /HPF (ref 0–5)
ERYTHROCYTE [DISTWIDTH] IN BLOOD BY AUTOMATED COUNT: 10.9 % (ref 11–16)
ETHANOLAMINE SERPL-MCNC: 153 MG/DL (ref 0–0.08)
GFR SERPLBLD CREATININE-BSD FMLA CKD-EPI: >60 ML/MIN/{1.73_M2}
GLOBULIN SER CALC-MCNC: 3.9 G/DL
GLUCOSE SERPL-MCNC: 296 MG/DL (ref 74–106)
GLUCOSE UR STRIP.AUTO-MCNC: >=1000 MG/DL
HCT VFR BLD AUTO: 48.8 % (ref 40–54)
HGB BLD-MCNC: 17.9 G/DL (ref 13–18)
HGB UR QL STRIP.AUTO: ABNORMAL
IMM GRANULOCYTES # BLD: 0 K/UL
IMM GRANULOCYTES NFR BLD: 0.3 % (ref 0–5)
KETONES UR STRIP.AUTO-MCNC: 15 MG/DL
LEUKOCYTE ESTERASE UR QL STRIP.AUTO: NEGATIVE
LIPASE SERPL-CCNC: 117 U/L (ref 5.6–51.3)
LYMPHOCYTES # BLD: 2 K/UL (ref 1.5–4)
LYMPHOCYTES NFR BLD: 33.1 %
MCH RBC QN AUTO: 34.1 PG (ref 27–32)
MCHC RBC AUTO-ENTMCNC: 36.7 G/DL (ref 31–35)
MCV RBC AUTO: 93 FL (ref 80–100)
METHADONE UR QL SCN: ABNORMAL
METHAMPHET UR QL SCN: ABNORMAL
MONOCYTES # BLD: 0.7 K/UL (ref 0.2–0.8)
MONOCYTES NFR BLD: 11 %
NEUTROPHILS # BLD: 3.2 K/UL (ref 2–7.5)
NEUTS SEG NFR BLD: 52.6 %
NITRITE UR QL STRIP.AUTO: NEGATIVE
NT-PROBNP SERPL-MCNC: 340 PG/ML (ref 0–1800)
OPIATES UR QL SCN: ABNORMAL
OXYCODONE UR QL SCN: ABNORMAL
PCP UR QL SCN: ABNORMAL
PH UR STRIP.AUTO: 5 [PH] (ref 5–8)
PLATELET # BLD AUTO: 247 K/UL (ref 150–400)
PMV BLD AUTO: 9.8 FL (ref 6–10)
POTASSIUM SERPL-SCNC: 4.2 MMOL/L (ref 3.4–5.1)
PROPOXYPH UR QL SCN: ABNORMAL
PROT SERPL-MCNC: 8.3 G/DL (ref 6.4–8.3)
PROT UR STRIP.AUTO-MCNC: 100 MG/DL
RBC # BLD AUTO: 5.25 M/UL (ref 4.5–6)
RBC #/AREA URNS HPF: ABNORMAL /HPF (ref 0–4)
SALICYLATES SERPL-MCNC: <0.3 MG/DL (ref 15–30)
SODIUM SERPL-SCNC: 132 MMOL/L (ref 136–145)
SP GR UR STRIP.AUTO: 1.01 (ref 1–1.03)
TRICYCLICS UR QL SCN: ABNORMAL
TSH SERPL DL<=0.005 MIU/L-ACNC: 1.32 UIU/ML (ref 0.27–4.2)
UA COMPLETE W REFLEX CULTURE PNL UR: ABNORMAL
UA DIPSTICK W REFLEX MICRO PNL UR: YES
URN SPEC COLLECT METH UR: ABNORMAL
UROBILINOGEN UR STRIP-ACNC: 2 E.U./DL
WBC # BLD AUTO: 6 K/UL (ref 4–11)
WBC #/AREA URNS HPF: ABNORMAL /HPF (ref 0–5)

## 2023-04-23 PROCEDURE — 84443 ASSAY THYROID STIM HORMONE: CPT

## 2023-04-23 PROCEDURE — 80143 DRUG ASSAY ACETAMINOPHEN: CPT

## 2023-04-23 PROCEDURE — 80307 DRUG TEST PRSMV CHEM ANLYZR: CPT

## 2023-04-23 PROCEDURE — 85379 FIBRIN DEGRADATION QUANT: CPT

## 2023-04-23 PROCEDURE — 71045 X-RAY EXAM CHEST 1 VIEW: CPT

## 2023-04-23 PROCEDURE — 6360000004 HC RX CONTRAST MEDICATION: Performed by: EMERGENCY MEDICINE

## 2023-04-23 PROCEDURE — 81001 URINALYSIS AUTO W/SCOPE: CPT

## 2023-04-23 PROCEDURE — 83880 ASSAY OF NATRIURETIC PEPTIDE: CPT

## 2023-04-23 PROCEDURE — 2500000003 HC RX 250 WO HCPCS: Performed by: EMERGENCY MEDICINE

## 2023-04-23 PROCEDURE — 93005 ELECTROCARDIOGRAM TRACING: CPT

## 2023-04-23 PROCEDURE — 6360000002 HC RX W HCPCS

## 2023-04-23 PROCEDURE — 82150 ASSAY OF AMYLASE: CPT

## 2023-04-23 PROCEDURE — 96375 TX/PRO/DX INJ NEW DRUG ADDON: CPT

## 2023-04-23 PROCEDURE — 6360000002 HC RX W HCPCS: Performed by: EMERGENCY MEDICINE

## 2023-04-23 PROCEDURE — 96361 HYDRATE IV INFUSION ADD-ON: CPT

## 2023-04-23 PROCEDURE — 96374 THER/PROPH/DIAG INJ IV PUSH: CPT

## 2023-04-23 PROCEDURE — 99285 EMERGENCY DEPT VISIT HI MDM: CPT

## 2023-04-23 PROCEDURE — 82077 ASSAY SPEC XCP UR&BREATH IA: CPT

## 2023-04-23 PROCEDURE — 83735 ASSAY OF MAGNESIUM: CPT

## 2023-04-23 PROCEDURE — 80179 DRUG ASSAY SALICYLATE: CPT

## 2023-04-23 PROCEDURE — 36415 COLL VENOUS BLD VENIPUNCTURE: CPT

## 2023-04-23 PROCEDURE — 80053 COMPREHEN METABOLIC PANEL: CPT

## 2023-04-23 PROCEDURE — 2580000003 HC RX 258: Performed by: EMERGENCY MEDICINE

## 2023-04-23 PROCEDURE — 83690 ASSAY OF LIPASE: CPT

## 2023-04-23 PROCEDURE — 85025 COMPLETE CBC W/AUTO DIFF WBC: CPT

## 2023-04-23 PROCEDURE — 2500000003 HC RX 250 WO HCPCS

## 2023-04-23 PROCEDURE — 74177 CT ABD & PELVIS W/CONTRAST: CPT

## 2023-04-23 RX ORDER — DILTIAZEM HYDROCHLORIDE 5 MG/ML
25 INJECTION INTRAVENOUS ONCE
Status: COMPLETED | OUTPATIENT
Start: 2023-04-23 | End: 2023-04-23

## 2023-04-23 RX ORDER — 0.9 % SODIUM CHLORIDE 0.9 %
1000 INTRAVENOUS SOLUTION INTRAVENOUS ONCE
Status: COMPLETED | OUTPATIENT
Start: 2023-04-23 | End: 2023-04-23

## 2023-04-23 RX ORDER — METOPROLOL TARTRATE 5 MG/5ML
5 INJECTION INTRAVENOUS ONCE
Status: COMPLETED | OUTPATIENT
Start: 2023-04-23 | End: 2023-04-23

## 2023-04-23 RX ORDER — ADENOSINE 3 MG/ML
12 INJECTION, SOLUTION INTRAVENOUS ONCE
Status: COMPLETED | OUTPATIENT
Start: 2023-04-23 | End: 2023-04-23

## 2023-04-23 RX ORDER — DILTIAZEM HYDROCHLORIDE 5 MG/ML
INJECTION INTRAVENOUS
Status: COMPLETED
Start: 2023-04-23 | End: 2023-04-23

## 2023-04-23 RX ORDER — ADENOSINE 3 MG/ML
6 INJECTION, SOLUTION INTRAVENOUS ONCE
Status: COMPLETED | OUTPATIENT
Start: 2023-04-23 | End: 2023-04-23

## 2023-04-23 RX ORDER — ADENOSINE 3 MG/ML
INJECTION, SOLUTION INTRAVENOUS
Status: COMPLETED
Start: 2023-04-23 | End: 2023-04-23

## 2023-04-23 RX ORDER — DILTIAZEM HYDROCHLORIDE 5 MG/ML
20 INJECTION INTRAVENOUS ONCE
Status: DISCONTINUED | OUTPATIENT
Start: 2023-04-23 | End: 2023-04-23

## 2023-04-23 RX ADMIN — SODIUM CHLORIDE 1000 ML: 9 INJECTION, SOLUTION INTRAVENOUS at 20:54

## 2023-04-23 RX ADMIN — DILTIAZEM HYDROCHLORIDE 25 MG: 5 INJECTION INTRAVENOUS at 21:01

## 2023-04-23 RX ADMIN — METOPROLOL TARTRATE 5 MG: 5 INJECTION, SOLUTION INTRAVENOUS at 21:05

## 2023-04-23 RX ADMIN — ADENOSINE 6 MG: 3 INJECTION INTRAVENOUS at 20:54

## 2023-04-23 RX ADMIN — IOPAMIDOL 100 ML: 755 INJECTION, SOLUTION INTRAVENOUS at 23:18

## 2023-04-23 RX ADMIN — ADENOSINE 6 MG: 3 INJECTION, SOLUTION INTRAVENOUS at 20:54

## 2023-04-23 RX ADMIN — ADENOSINE 12 MG: 3 INJECTION INTRAVENOUS at 20:58

## 2023-04-23 ASSESSMENT — PAIN - FUNCTIONAL ASSESSMENT: PAIN_FUNCTIONAL_ASSESSMENT: NONE - DENIES PAIN

## 2023-04-24 ENCOUNTER — OUTSIDE FACILITY SERVICE (OUTPATIENT)
Dept: CARDIOLOGY | Facility: CLINIC | Age: 53
End: 2023-04-24

## 2023-04-24 PROBLEM — I47.1 SVT (SUPRAVENTRICULAR TACHYCARDIA) (HCC): Status: ACTIVE | Noted: 2023-04-24

## 2023-04-24 PROBLEM — R10.9 ABDOMINAL PAIN: Status: ACTIVE | Noted: 2023-04-24

## 2023-04-24 PROBLEM — I16.0 HYPERTENSIVE URGENCY: Status: ACTIVE | Noted: 2023-04-24

## 2023-04-24 PROBLEM — Z86.69 HISTORY OF SLEEP APNEA: Status: ACTIVE | Noted: 2023-04-24

## 2023-04-24 PROBLEM — F10.20 ETOH DEPENDENCE (HCC): Status: ACTIVE | Noted: 2023-04-24

## 2023-04-24 PROBLEM — I48.92 ATRIAL FLUTTER (HCC): Status: ACTIVE | Noted: 2023-04-24

## 2023-04-24 PROBLEM — E11.9 TYPE 2 DIABETES MELLITUS, WITHOUT LONG-TERM CURRENT USE OF INSULIN (HCC): Status: ACTIVE | Noted: 2023-04-24

## 2023-04-24 LAB
ALBUMIN SERPL-MCNC: 3.7 G/DL (ref 3.4–4.8)
ALBUMIN/GLOB SERPL: 1.2 {RATIO} (ref 0.8–2)
ALP SERPL-CCNC: 72 U/L (ref 25–100)
ALT SERPL-CCNC: 59 U/L (ref 4–36)
ANION GAP SERPL CALCULATED.3IONS-SCNC: 17 MMOL/L (ref 3–16)
AST SERPL-CCNC: 54 U/L (ref 8–33)
BASOPHILS # BLD: 0.1 K/UL (ref 0–0.1)
BASOPHILS NFR BLD: 1.3 %
BILIRUB SERPL-MCNC: 1.6 MG/DL (ref 0.3–1.2)
BUN SERPL-MCNC: 12 MG/DL (ref 6–20)
CALCIUM SERPL-MCNC: 8.6 MG/DL (ref 8.5–10.5)
CHLORIDE SERPL-SCNC: 95 MMOL/L (ref 98–107)
CHOLEST SERPL-MCNC: 158 MG/DL (ref 0–200)
CO2 SERPL-SCNC: 19 MMOL/L (ref 20–30)
CREAT SERPL-MCNC: 0.9 MG/DL (ref 0.4–1.2)
EOSINOPHIL # BLD: 0.2 K/UL (ref 0–0.4)
EOSINOPHIL NFR BLD: 2.5 %
ERYTHROCYTE [DISTWIDTH] IN BLOOD BY AUTOMATED COUNT: 10.8 % (ref 11–16)
GFR SERPLBLD CREATININE-BSD FMLA CKD-EPI: >60 ML/MIN/{1.73_M2}
GLOBULIN SER CALC-MCNC: 3.2 G/DL
GLUCOSE BLD-MCNC: 213 MG/DL (ref 74–106)
GLUCOSE BLD-MCNC: 241 MG/DL (ref 74–106)
GLUCOSE BLD-MCNC: 242 MG/DL (ref 74–106)
GLUCOSE BLD-MCNC: 334 MG/DL (ref 74–106)
GLUCOSE SERPL-MCNC: 216 MG/DL (ref 74–106)
HBA1C MFR BLD: 9 %
HCT VFR BLD AUTO: 41.5 % (ref 40–54)
HDLC SERPL-MCNC: 73 MG/DL (ref 40–60)
HGB BLD-MCNC: 14.9 G/DL (ref 13–18)
IMM GRANULOCYTES # BLD: 0 K/UL
IMM GRANULOCYTES NFR BLD: 0.5 % (ref 0–5)
LDLC SERPL CALC-MCNC: 55 MG/DL
LIPASE SERPL-CCNC: 35 U/L (ref 5.6–51.3)
LV EF: 63 %
LVEF MODALITY: NORMAL
LYMPHOCYTES # BLD: 1.5 K/UL (ref 1.5–4)
LYMPHOCYTES NFR BLD: 23.6 %
MAGNESIUM SERPL-MCNC: 1.9 MG/DL (ref 1.7–2.4)
MAGNESIUM SERPL-MCNC: 1.9 MG/DL (ref 1.7–2.4)
MCH RBC QN AUTO: 34.3 PG (ref 27–32)
MCHC RBC AUTO-ENTMCNC: 35.9 G/DL (ref 31–35)
MCV RBC AUTO: 95.4 FL (ref 80–100)
MONOCYTES # BLD: 0.8 K/UL (ref 0.2–0.8)
MONOCYTES NFR BLD: 12.2 %
NEUTROPHILS # BLD: 3.8 K/UL (ref 2–7.5)
NEUTS SEG NFR BLD: 59.9 %
PERFORMED ON: ABNORMAL
PLATELET # BLD AUTO: 180 K/UL (ref 150–400)
PMV BLD AUTO: 9.9 FL (ref 6–10)
POTASSIUM SERPL-SCNC: 4.5 MMOL/L (ref 3.4–5.1)
PROT SERPL-MCNC: 6.9 G/DL (ref 6.4–8.3)
RBC # BLD AUTO: 4.35 M/UL (ref 4.5–6)
SARS-COV-2 RDRP RESP QL NAA+PROBE: NOT DETECTED
SODIUM SERPL-SCNC: 131 MMOL/L (ref 136–145)
TRIGL SERPL-MCNC: 149 MG/DL (ref 0–249)
VLDLC SERPL CALC-MCNC: 30 MG/DL
WBC # BLD AUTO: 6.4 K/UL (ref 4–11)

## 2023-04-24 PROCEDURE — 83735 ASSAY OF MAGNESIUM: CPT

## 2023-04-24 PROCEDURE — 80061 LIPID PANEL: CPT

## 2023-04-24 PROCEDURE — 6360000002 HC RX W HCPCS: Performed by: PHYSICIAN ASSISTANT

## 2023-04-24 PROCEDURE — C9113 INJ PANTOPRAZOLE SODIUM, VIA: HCPCS | Performed by: PHYSICIAN ASSISTANT

## 2023-04-24 PROCEDURE — 1200000000 HC SEMI PRIVATE

## 2023-04-24 PROCEDURE — 83690 ASSAY OF LIPASE: CPT

## 2023-04-24 PROCEDURE — 80053 COMPREHEN METABOLIC PANEL: CPT

## 2023-04-24 PROCEDURE — 2580000003 HC RX 258: Performed by: INTERNAL MEDICINE

## 2023-04-24 PROCEDURE — 94640 AIRWAY INHALATION TREATMENT: CPT

## 2023-04-24 PROCEDURE — 2500000003 HC RX 250 WO HCPCS: Performed by: INTERNAL MEDICINE

## 2023-04-24 PROCEDURE — 93306 TTE W/DOPPLER COMPLETE: CPT

## 2023-04-24 PROCEDURE — 6360000002 HC RX W HCPCS: Performed by: INTERNAL MEDICINE

## 2023-04-24 PROCEDURE — 83036 HEMOGLOBIN GLYCOSYLATED A1C: CPT

## 2023-04-24 PROCEDURE — 6370000000 HC RX 637 (ALT 250 FOR IP): Performed by: PHYSICIAN ASSISTANT

## 2023-04-24 PROCEDURE — 6360000002 HC RX W HCPCS: Performed by: EMERGENCY MEDICINE

## 2023-04-24 PROCEDURE — 87635 SARS-COV-2 COVID-19 AMP PRB: CPT

## 2023-04-24 PROCEDURE — 2580000003 HC RX 258: Performed by: PHYSICIAN ASSISTANT

## 2023-04-24 PROCEDURE — 36415 COLL VENOUS BLD VENIPUNCTURE: CPT

## 2023-04-24 PROCEDURE — 96375 TX/PRO/DX INJ NEW DRUG ADDON: CPT

## 2023-04-24 PROCEDURE — 85025 COMPLETE CBC W/AUTO DIFF WBC: CPT

## 2023-04-24 PROCEDURE — 97802 MEDICAL NUTRITION INDIV IN: CPT

## 2023-04-24 PROCEDURE — 99223 1ST HOSP IP/OBS HIGH 75: CPT | Performed by: INTERNAL MEDICINE

## 2023-04-24 PROCEDURE — A4216 STERILE WATER/SALINE, 10 ML: HCPCS | Performed by: PHYSICIAN ASSISTANT

## 2023-04-24 RX ORDER — LISINOPRIL 10 MG/1
10 TABLET ORAL DAILY
Status: DISCONTINUED | OUTPATIENT
Start: 2023-04-24 | End: 2023-04-25 | Stop reason: HOSPADM

## 2023-04-24 RX ORDER — ONDANSETRON 2 MG/ML
4 INJECTION INTRAMUSCULAR; INTRAVENOUS EVERY 6 HOURS PRN
Status: DISCONTINUED | OUTPATIENT
Start: 2023-04-24 | End: 2023-04-25 | Stop reason: HOSPADM

## 2023-04-24 RX ORDER — MAGNESIUM SULFATE 1 G/100ML
1000 INJECTION INTRAVENOUS ONCE
Status: COMPLETED | OUTPATIENT
Start: 2023-04-24 | End: 2023-04-24

## 2023-04-24 RX ORDER — ASCORBIC ACID, VITAMIN A PALMITATE, CHOLECALCIFEROL, THIAMINE HYDROCHLORIDE, RIBOFLAVIN-5 PHOSPHATE SODIUM, PYRIDOXINE HYDROCHLORIDE, NIACINAMIDE, DEXPANTHENOL, ALPHA-TOCOPHEROL ACETATE, VITAMIN K1, FOLIC ACID, BIOTIN, CYANOCOBALAMIN 200; 3300; 200; 6; 3.6; 6; 40; 15; 10; 150; 600; 60; 5 MG/10ML; [IU]/10ML; [IU]/10ML; MG/10ML; MG/10ML; MG/10ML; MG/10ML; MG/10ML; [IU]/10ML; UG/10ML; UG/10ML; UG/10ML; UG/10ML
INJECTION, SOLUTION INTRAVENOUS
Status: DISPENSED
Start: 2023-04-24 | End: 2023-04-24

## 2023-04-24 RX ORDER — LORAZEPAM 2 MG/ML
2 INJECTION INTRAMUSCULAR
Status: DISCONTINUED | OUTPATIENT
Start: 2023-04-24 | End: 2023-04-25 | Stop reason: HOSPADM

## 2023-04-24 RX ORDER — LORAZEPAM 0.5 MG/1
2 TABLET ORAL
Status: DISCONTINUED | OUTPATIENT
Start: 2023-04-24 | End: 2023-04-25 | Stop reason: HOSPADM

## 2023-04-24 RX ORDER — INSULIN LISPRO 100 [IU]/ML
0-4 INJECTION, SOLUTION INTRAVENOUS; SUBCUTANEOUS
Status: DISCONTINUED | OUTPATIENT
Start: 2023-04-25 | End: 2023-04-25 | Stop reason: HOSPADM

## 2023-04-24 RX ORDER — ACETAMINOPHEN 325 MG/1
650 TABLET ORAL EVERY 6 HOURS PRN
Status: DISCONTINUED | OUTPATIENT
Start: 2023-04-24 | End: 2023-04-25 | Stop reason: HOSPADM

## 2023-04-24 RX ORDER — LORAZEPAM 0.5 MG/1
1 TABLET ORAL
Status: DISCONTINUED | OUTPATIENT
Start: 2023-04-24 | End: 2023-04-25 | Stop reason: HOSPADM

## 2023-04-24 RX ORDER — DEXTROSE MONOHYDRATE 100 MG/ML
INJECTION, SOLUTION INTRAVENOUS CONTINUOUS PRN
Status: DISCONTINUED | OUTPATIENT
Start: 2023-04-24 | End: 2023-04-25 | Stop reason: HOSPADM

## 2023-04-24 RX ORDER — ACETAMINOPHEN 650 MG/1
650 SUPPOSITORY RECTAL EVERY 6 HOURS PRN
Status: DISCONTINUED | OUTPATIENT
Start: 2023-04-24 | End: 2023-04-25 | Stop reason: HOSPADM

## 2023-04-24 RX ORDER — ENOXAPARIN SODIUM 100 MG/ML
40 INJECTION SUBCUTANEOUS DAILY
Status: DISCONTINUED | OUTPATIENT
Start: 2023-04-24 | End: 2023-04-25 | Stop reason: HOSPADM

## 2023-04-24 RX ORDER — THIAMINE HYDROCHLORIDE 100 MG/ML
INJECTION, SOLUTION INTRAMUSCULAR; INTRAVENOUS
Status: DISPENSED
Start: 2023-04-24 | End: 2023-04-24

## 2023-04-24 RX ORDER — ONDANSETRON 4 MG/1
4 TABLET, ORALLY DISINTEGRATING ORAL EVERY 8 HOURS PRN
Status: DISCONTINUED | OUTPATIENT
Start: 2023-04-24 | End: 2023-04-25 | Stop reason: HOSPADM

## 2023-04-24 RX ORDER — LORAZEPAM 2 MG/ML
1 INJECTION INTRAMUSCULAR
Status: DISCONTINUED | OUTPATIENT
Start: 2023-04-24 | End: 2023-04-25 | Stop reason: HOSPADM

## 2023-04-24 RX ORDER — MECOBALAMIN 5000 MCG
5 TABLET,DISINTEGRATING ORAL NIGHTLY
Status: DISCONTINUED | OUTPATIENT
Start: 2023-04-24 | End: 2023-04-25 | Stop reason: HOSPADM

## 2023-04-24 RX ORDER — LORAZEPAM 2 MG/ML
1 INJECTION INTRAMUSCULAR ONCE
Status: COMPLETED | OUTPATIENT
Start: 2023-04-24 | End: 2023-04-24

## 2023-04-24 RX ORDER — POLYETHYLENE GLYCOL 3350 17 G/17G
17 POWDER, FOR SOLUTION ORAL DAILY PRN
Status: DISCONTINUED | OUTPATIENT
Start: 2023-04-24 | End: 2023-04-25 | Stop reason: HOSPADM

## 2023-04-24 RX ORDER — LORAZEPAM 2 MG/ML
4 INJECTION INTRAMUSCULAR
Status: DISCONTINUED | OUTPATIENT
Start: 2023-04-24 | End: 2023-04-25 | Stop reason: HOSPADM

## 2023-04-24 RX ORDER — LORAZEPAM 2 MG/1
4 TABLET ORAL
Status: DISCONTINUED | OUTPATIENT
Start: 2023-04-24 | End: 2023-04-25 | Stop reason: HOSPADM

## 2023-04-24 RX ORDER — FOLIC ACID 5 MG/ML
INJECTION, SOLUTION INTRAMUSCULAR; INTRAVENOUS; SUBCUTANEOUS
Status: DISCONTINUED
Start: 2023-04-24 | End: 2023-04-24 | Stop reason: WASHOUT

## 2023-04-24 RX ORDER — BUDESONIDE 0.5 MG/2ML
0.5 INHALANT ORAL 2 TIMES DAILY
Status: DISCONTINUED | OUTPATIENT
Start: 2023-04-24 | End: 2023-04-25 | Stop reason: HOSPADM

## 2023-04-24 RX ORDER — INSULIN LISPRO 100 [IU]/ML
0-4 INJECTION, SOLUTION INTRAVENOUS; SUBCUTANEOUS NIGHTLY
Status: DISCONTINUED | OUTPATIENT
Start: 2023-04-24 | End: 2023-04-25 | Stop reason: HOSPADM

## 2023-04-24 RX ORDER — LORAZEPAM 2 MG/ML
3 INJECTION INTRAMUSCULAR
Status: DISCONTINUED | OUTPATIENT
Start: 2023-04-24 | End: 2023-04-25 | Stop reason: HOSPADM

## 2023-04-24 RX ORDER — FLUTICASONE PROPIONATE AND SALMETEROL 250; 50 UG/1; UG/1
1 POWDER RESPIRATORY (INHALATION) EVERY 12 HOURS
Status: ON HOLD | COMMUNITY
End: 2023-04-24

## 2023-04-24 RX ORDER — GAUZE BANDAGE 2" X 2"
100 BANDAGE TOPICAL DAILY
Status: DISCONTINUED | OUTPATIENT
Start: 2023-04-24 | End: 2023-04-25 | Stop reason: HOSPADM

## 2023-04-24 RX ORDER — LEVALBUTEROL INHALATION SOLUTION 1.25 MG/3ML
1.25 SOLUTION RESPIRATORY (INHALATION)
Status: DISCONTINUED | OUTPATIENT
Start: 2023-04-24 | End: 2023-04-25

## 2023-04-24 RX ADMIN — MAGNESIUM SULFATE HEPTAHYDRATE 1000 MG: 1 INJECTION, SOLUTION INTRAVENOUS at 15:37

## 2023-04-24 RX ADMIN — LISINOPRIL 10 MG: 10 TABLET ORAL at 13:01

## 2023-04-24 RX ADMIN — THIAMINE HCL TAB 100 MG 100 MG: 100 TAB at 12:05

## 2023-04-24 RX ADMIN — MAGNESIUM SULFATE HEPTAHYDRATE 1000 MG: 1 INJECTION, SOLUTION INTRAVENOUS at 02:23

## 2023-04-24 RX ADMIN — FOLIC ACID: 5 INJECTION, SOLUTION INTRAMUSCULAR; INTRAVENOUS; SUBCUTANEOUS at 04:02

## 2023-04-24 RX ADMIN — INSULIN LISPRO 4 UNITS: 100 INJECTION, SOLUTION INTRAVENOUS; SUBCUTANEOUS at 21:01

## 2023-04-24 RX ADMIN — METOPROLOL TARTRATE 25 MG: 25 TABLET, FILM COATED ORAL at 09:53

## 2023-04-24 RX ADMIN — LEVALBUTEROL HYDROCHLORIDE 1.25 MG: 1.25 SOLUTION RESPIRATORY (INHALATION) at 10:18

## 2023-04-24 RX ADMIN — SODIUM CHLORIDE 40 MG: 9 INJECTION, SOLUTION INTRAMUSCULAR; INTRAVENOUS; SUBCUTANEOUS at 09:54

## 2023-04-24 RX ADMIN — BUDESONIDE 500 MCG: 0.5 SUSPENSION RESPIRATORY (INHALATION) at 18:23

## 2023-04-24 RX ADMIN — LEVALBUTEROL HYDROCHLORIDE 1.25 MG: 1.25 SOLUTION RESPIRATORY (INHALATION) at 14:00

## 2023-04-24 RX ADMIN — ENOXAPARIN SODIUM 40 MG: 100 INJECTION SUBCUTANEOUS at 08:32

## 2023-04-24 RX ADMIN — LORAZEPAM 1 MG: 2 INJECTION INTRAMUSCULAR; INTRAVENOUS at 00:41

## 2023-04-24 RX ADMIN — BUDESONIDE 500 MCG: 0.5 SUSPENSION RESPIRATORY (INHALATION) at 10:18

## 2023-04-24 RX ADMIN — METOPROLOL TARTRATE 25 MG: 25 TABLET, FILM COATED ORAL at 21:00

## 2023-04-24 RX ADMIN — LEVALBUTEROL HYDROCHLORIDE 1.25 MG: 1.25 SOLUTION RESPIRATORY (INHALATION) at 18:22

## 2023-04-24 RX ADMIN — Medication 5 MG: at 21:00

## 2023-04-24 ASSESSMENT — ENCOUNTER SYMPTOMS
TROUBLE SWALLOWING: 0
DIARRHEA: 0
ABDOMINAL PAIN: 1
WHEEZING: 0
SINUS PRESSURE: 0
RHINORRHEA: 0
VOMITING: 1
NAUSEA: 1
EYE PAIN: 0
SORE THROAT: 0
CHOKING: 0
CHEST TIGHTNESS: 0
EYE ITCHING: 0
SHORTNESS OF BREATH: 0
EYE REDNESS: 0
COUGH: 0

## 2023-04-24 NOTE — FLOWSHEET NOTE
04/24/23 1157   CIWA-Ar   BP (!) 162/110   Heart Rate 71   Nausea and Vomiting 0   Tactile Disturbances 0   Tremor 2   Auditory Disturbances 1   Paroxysmal Sweats 0   Visual Disturbances 0   Anxiety 0   Headache, Fullness in Head 0   Agitation 1   Orientation and Clouding of Sensorium 0   CIWA-Ar Total 4     Provider aware of elevated BP

## 2023-04-24 NOTE — ED PROVIDER NOTES
04/24/23 0317 04/24/23 0536   BP: 119/75 (!) 143/101 (!) 143/101 (!) 165/84   Pulse: (!) 101 93 90 99   Resp: 18 26     Temp:   99.5 °F (37.5 °C) 98.4 °F (36.9 °C)   TempSrc:   Oral    SpO2:   94% 96%   Weight:       Height:               Medical Decision Making  Eden Lee is a 50 y.o. male who presents to the emergency department with multiple and various complaints. Initially he stated that he wants to quit drinking, but immediately he stated, afterwards that he will do that on his own, as outpatient. 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 Patient noted that over the past months he would develop severe abdominal pain with violent episodes of nausea and vomiting each time he would binge drink, but, however, after abstaining from alcohol for couple days the pain gradually go away. Patient paranoid about possibly having cancer, as his appetite has been poor recently. While his vital signs and who came patient to telemetry the nurse realized the patient's heart rate was in the 170s, without patient complaining of shortness of breath or chest pain. Problems Addressed:  Acute alcoholic intoxication with complication [facility]): acute illness or injury  Alcohol-induced acute pancreatitis, unspecified complication status: acute illness or injury  Paroxysmal atrial flutter ([facility] [address]): acute illness or injury    Amount and/or Complexity of Data Reviewed  Labs: ordered. Radiology: ordered. ECG/medicine tests: ordered. Risk  OTC drugs. Prescription drug management. Decision regarding hospitalization. REASSESSMENT     ED Course as of 04/24/23 9692   Arpit Marie Apr 23, 2023   2334 The abdomen and pelvis with IV contrast  IMPRESSION:     1. No acute intra-abdominopelvic abnormality. [DS]   Mon Apr 24, 2023   0015 Discussed with Dr. Rob Truong, advised of patient's presentation, findings, medical so far, agreeable with hospitalization.  [DS]   0606 Glucose, UA(!):

## 2023-04-24 NOTE — ACP (ADVANCE CARE PLANNING)
Advance Care Planning     General Advance Care Planning (ACP) Conversation    Date of Conversation: 4/24/2023  Conducted with: Patient with Decision Making Capacity    Healthcare Decision Maker:    Primary Decision Maker: tobi romo - Spouse - [phone]  Click here to complete Healthcare Decision Makers including selection of the Healthcare Decision Maker Relationship (ie \"Primary\"). Today we documented Decision Maker(s) consistent with Legal Next of Kin hierarchy.     Content/Action Overview:  Has NO ACP documents/care preferences - information provided, considering goals and options  Reviewed DNR/DNI and patient elects Full Code (Attempt Resuscitation)  artificial nutrition, ventilation preferences, and resuscitation preferences      Length of Voluntary ACP Conversation in minutes:  <16 minutes (Non-Billable)    Amanda Amos

## 2023-04-24 NOTE — ED NOTES
Iv metoprolol 5mg given at this time slow iv push per [facility]. MD remains at bedside. Pt had rhythm change at this time to aflutter/afib with heart rate of 99. MD remains at beside.       Louie Beltran RN  04/23/23 2838

## 2023-04-24 NOTE — ED NOTES
Pt requested to sit up in bed r/t to \"tailbone pain\" pt then converted to normal sinus rhythm at a rate of 76. Md made aware.      Soraya Santizo RN  04/23/23 4863

## 2023-04-24 NOTE — ED NOTES
Pt taken to room 5 states he is here to the ED because he wants to quit drinking. Pt states he has been vomiting a lot over the last 2 months and is concerned that he may have cancer r/t weight loss and no appetite and vomiting. Pt sitting on side of the bed talking with RN Jace Flowers RN and Dr. Esteban Kirk). Pt hooked up to vital signs and was noted to have a heart rate in the 170's. Hooked pt up to tele monitor to see heart rate and then pt was immediately moved to Trauma 2. Pt denies any chest pain or shortness of breath.       Elliott Spence RN  04/23/23 2112       Allan Hadley RN  04/23/23 2138

## 2023-04-24 NOTE — ED NOTES
Pt given adenosine 6mg at this time, [facility] with no change in rhythm pt remains in svt with rate in the 170's. MD @ bedside.       Vernon Hall, LIZBET  04/23/23 Helga 3201, LIZBET  04/23/23 2831

## 2023-04-24 NOTE — ED NOTES
Second dose of adenosine [facility] at this time [facility] no change in [facility]. MD remains at bedside.       Sangita Martinez RN  04/23/23 9602

## 2023-04-24 NOTE — PROGRESS NOTES
Peer Recovery Support Note    Name: Castillo Martínez [facility]  Date: 4/24/2023    Chief Complaint   Patient presents with    Anorexia    Nausea & Vomiting       Peer Support met with patient. [x] Support and education provided  [x] Resources provided   [] Treatment referral:   [] Other:   [] Patient declined peer recovery services     Referred By: Armaan Purvis, LIZBET    Notes: Pt states he is in here because he tried to stop drinking and it caused him to get extremely sick. He says if he has not drank by the time he is discharged tomorrow then he has met his goal. He says he does not need to go to any facility at this time and he says he can do this on his own. I asked him how he has been feeling and he says he is feeling great right now. I told him I was here for him with any support I could give and made sure he had my contact information. I also left him with a resource packet.      Signed: Jamin Krause, 4/24/2023

## 2023-04-24 NOTE — CONSULTS
Comprehensive Nutrition Assessment    Type and Reason for Visit:  Positive Nutrition Screen, Consult, Initial    Nutrition Recommendations/Plan:   4 CHO added to existing diet order d/t DM. Obtain bedscale weight once medically feasible. Continue to encourage PO intake as appropriate. Pt intake of 26-50% x 1 meal.   Pt states he has tried regular Ensure before and did not like it. He states he will try the Ensure Clear. RD to follow pt and available PRN. Malnutrition Assessment:  Malnutrition Status: At risk for malnutrition (Comment) (r/t weight loss) (04/24/23 1054)    Context:  Chronic Illness     Findings of the 6 clinical characteristics of malnutrition:  Energy Intake:  Mild decrease in energy intake (Comment) (Report of decreased PO intakes; 26-50% x 1 meal)  Weight Loss:   (13.2% in ~ 9 months utilizing estimated and stated body weights.)     Body Fat Loss:  No significant body fat loss     Muscle Mass Loss:  No significant muscle mass loss    Fluid Accumulation:  No significant fluid accumulation     Strength:  Not Performed    Nutrition Assessment:    Pt admitted with abdominal pain. Pt is at moderate-high risk for ongoing nutritional compromise r/t weight loss and report of decreased PO intakes. Nutrition Related Findings:    Weight loss of 13.2% in ~ 9 months utilizing estimated and stated body weights. PMH COPD, DM, HTN, ETOH use. Pt reports N/V, poor appetite. No edema. Medications: thiamine, protonix, MVI with vitamin K. Labs: Na+ 131, Cl- 95, glu 216-296, ALT 59, AST 54, amylase 183, bilirubin 1.6, A1c 9.0. Wound Type: None       Current Nutrition Intake & Therapies:    Average Meal Intake: 26-50% (x 1 meal)  Average Supplements Intake: None Ordered  ADULT ORAL NUTRITION SUPPLEMENT; Breakfast, Dinner; Diabetic Oral Supplement  ADULT DIET;  Regular; 4 carb choices (60 gm/meal)    Anthropometric Measures:  Height: 5' 11\" (180.3 cm)  Ideal Body Weight (IBW): 172 lbs (78 kg)

## 2023-04-24 NOTE — ED NOTES
Pt given diltiazem 25mg at this time, slow iv push over 2 minutes per [employer]. No change in cardiac rhythm, MD remains at bedside.        Kathie Ribera RN  04/23/23 6653

## 2023-04-24 NOTE — PROGRESS NOTES
Medication Reconciliation completed with patient interview. Patient stated he does not use a pharmacy and has not seen a doctor in several years. Patient does use several inhalers:    -Ventolin 2 puffs 4 times daily prn  -Combivent Respimat  mcg/act 2 puffs bid  -Symbicort 160-4.5 mc/act 2 puffs prn  -Advair 250/50 mcg/act 1 puff in the morning and 1 puff in the evening    There are no doctor orders or fill histories to support this but patient did physically have the inhalers with him. Home medication field cleared. Please provide new scripts for any discharge medications the patient will need.

## 2023-04-24 NOTE — H&P
History and Physical    Patient:  Alycia Alegre    CHIEF COMPLAINT:    Nausea, vomiting, etoh abuse    HISTORY OF PRESENT ILLNESS:   The patient is a 50 y.o. male with PMH of MIGUEL, uncontrolled HTN, uncontrolled DM, COPD, ETOH dependence, and medical noncompliance who hasn't taken any medications since 2008 who presents due to nausea, vomiting, and stating he wants  to quit drinking. On arrival to ER HR in 170s and in SVT then atrial flutter. Required adenosine and diltiazem before converting to NSR. Some report of abdominal pain but patient states he didn't have any pain he just had nausea and vomiting. CT abd/pelv negative. States that he vomited because of the phlegm stuck in his throat related to his COPD. [facility] that he buys from people because he has known history of COPD. Reports that he has been cutting back on his alcohol use for 8 weeks and is now down to 3 beers per day but wants to quit completely. When asked when he started drinking he states \"you mean when I was a fetus? My mom was an alcoholic. \" He has no medical insurance. Has not taken any medications or had a primary care provider in more than 10 years. He knows he has diabetes, high BP, and MIGUEL but hasn't addressed any of these issues. States that the cpap machine and certain medications have dangerous side effects and he wasn't willing to continue. Today he is tremulous and BP 160s/110s. He seems anxious and is speaking rapidly. NSR with HR in 60s. Denies acute complaint today. Past Medical History:      Diagnosis Date    Asthma     COPD (chronic obstructive pulmonary disease) ([facility] [address]     Diabetes mellitus ([facility] [address]     Hypertension        Past Surgical History:      Procedure Laterality Date    HERNIA REPAIR      TONSILLECTOMY         Medications Prior to Admission:    Prior to Admission medications    Not on File       Allergies:  Patient has no known allergies.     Social History:   TOBACCO:   reports that he quit

## 2023-04-25 VITALS
BODY MASS INDEX: 23.87 KG/M2 | HEIGHT: 71 IN | DIASTOLIC BLOOD PRESSURE: 84 MMHG | OXYGEN SATURATION: 98 % | HEART RATE: 57 BPM | TEMPERATURE: 98.4 F | RESPIRATION RATE: 18 BRPM | WEIGHT: 170.5 LBS | SYSTOLIC BLOOD PRESSURE: 128 MMHG

## 2023-04-25 LAB
ALBUMIN SERPL-MCNC: 4 G/DL (ref 3.4–4.8)
ALBUMIN/GLOB SERPL: 1.3 {RATIO} (ref 0.8–2)
ALP SERPL-CCNC: 74 U/L (ref 25–100)
ALT SERPL-CCNC: 50 U/L (ref 4–36)
ANION GAP SERPL CALCULATED.3IONS-SCNC: 15 MMOL/L (ref 3–16)
AST SERPL-CCNC: 36 U/L (ref 8–33)
BASOPHILS # BLD: 0.1 K/UL (ref 0–0.1)
BASOPHILS NFR BLD: 1.1 %
BILIRUB SERPL-MCNC: 1.6 MG/DL (ref 0.3–1.2)
BUN SERPL-MCNC: 10 MG/DL (ref 6–20)
CALCIUM SERPL-MCNC: 8.7 MG/DL (ref 8.5–10.5)
CHLORIDE SERPL-SCNC: 95 MMOL/L (ref 98–107)
CO2 SERPL-SCNC: 23 MMOL/L (ref 20–30)
CREAT SERPL-MCNC: 0.8 MG/DL (ref 0.4–1.2)
EOSINOPHIL # BLD: 0.2 K/UL (ref 0–0.4)
EOSINOPHIL NFR BLD: 4.6 %
ERYTHROCYTE [DISTWIDTH] IN BLOOD BY AUTOMATED COUNT: 11 % (ref 11–16)
GFR SERPLBLD CREATININE-BSD FMLA CKD-EPI: >60 ML/MIN/{1.73_M2}
GLOBULIN SER CALC-MCNC: 3.1 G/DL
GLUCOSE BLD-MCNC: 192 MG/DL (ref 74–106)
GLUCOSE BLD-MCNC: 233 MG/DL (ref 74–106)
GLUCOSE SERPL-MCNC: 204 MG/DL (ref 74–106)
HCT VFR BLD AUTO: 41.1 % (ref 40–54)
HGB BLD-MCNC: 14.9 G/DL (ref 13–18)
IMM GRANULOCYTES # BLD: 0 K/UL
IMM GRANULOCYTES NFR BLD: 0.2 % (ref 0–5)
LYMPHOCYTES # BLD: 1.6 K/UL (ref 1.5–4)
LYMPHOCYTES NFR BLD: 30.4 %
MCH RBC QN AUTO: 34.7 PG (ref 27–32)
MCHC RBC AUTO-ENTMCNC: 36.3 G/DL (ref 31–35)
MCV RBC AUTO: 95.8 FL (ref 80–100)
MONOCYTES # BLD: 0.6 K/UL (ref 0.2–0.8)
MONOCYTES NFR BLD: 11.8 %
NEUTROPHILS # BLD: 2.7 K/UL (ref 2–7.5)
NEUTS SEG NFR BLD: 51.9 %
PERFORMED ON: ABNORMAL
PERFORMED ON: ABNORMAL
PLATELET # BLD AUTO: 197 K/UL (ref 150–400)
PMV BLD AUTO: 10 FL (ref 6–10)
POTASSIUM SERPL-SCNC: 3.7 MMOL/L (ref 3.4–5.1)
PROT SERPL-MCNC: 7.1 G/DL (ref 6.4–8.3)
RBC # BLD AUTO: 4.29 M/UL (ref 4.5–6)
SODIUM SERPL-SCNC: 133 MMOL/L (ref 136–145)
WBC # BLD AUTO: 5.3 K/UL (ref 4–11)

## 2023-04-25 PROCEDURE — 36415 COLL VENOUS BLD VENIPUNCTURE: CPT

## 2023-04-25 PROCEDURE — 6370000000 HC RX 637 (ALT 250 FOR IP): Performed by: PHYSICIAN ASSISTANT

## 2023-04-25 PROCEDURE — 2580000003 HC RX 258: Performed by: PHYSICIAN ASSISTANT

## 2023-04-25 PROCEDURE — 80053 COMPREHEN METABOLIC PANEL: CPT

## 2023-04-25 PROCEDURE — A4216 STERILE WATER/SALINE, 10 ML: HCPCS | Performed by: PHYSICIAN ASSISTANT

## 2023-04-25 PROCEDURE — 6360000002 HC RX W HCPCS: Performed by: PHYSICIAN ASSISTANT

## 2023-04-25 PROCEDURE — 94761 N-INVAS EAR/PLS OXIMETRY MLT: CPT

## 2023-04-25 PROCEDURE — C9113 INJ PANTOPRAZOLE SODIUM, VIA: HCPCS | Performed by: PHYSICIAN ASSISTANT

## 2023-04-25 PROCEDURE — 6360000002 HC RX W HCPCS: Performed by: INTERNAL MEDICINE

## 2023-04-25 PROCEDURE — 99239 HOSP IP/OBS DSCHRG MGMT >30: CPT | Performed by: INTERNAL MEDICINE

## 2023-04-25 PROCEDURE — 94640 AIRWAY INHALATION TREATMENT: CPT

## 2023-04-25 PROCEDURE — 85025 COMPLETE CBC W/AUTO DIFF WBC: CPT

## 2023-04-25 RX ORDER — ALOGLIPTIN 25 MG/1
25 TABLET, FILM COATED ORAL DAILY
Qty: 30 TABLET | Refills: 0 | Status: SHIPPED | OUTPATIENT
Start: 2023-04-26

## 2023-04-25 RX ORDER — BUDESONIDE AND FORMOTEROL FUMARATE DIHYDRATE 160; 4.5 UG/1; UG/1
2 AEROSOL RESPIRATORY (INHALATION) DAILY
Qty: 10.2 G | Refills: 0 | Status: SHIPPED | OUTPATIENT
Start: 2023-04-25

## 2023-04-25 RX ORDER — LEVALBUTEROL INHALATION SOLUTION 1.25 MG/3ML
1.25 SOLUTION RESPIRATORY (INHALATION) EVERY 8 HOURS PRN
Status: DISCONTINUED | OUTPATIENT
Start: 2023-04-25 | End: 2023-04-25 | Stop reason: HOSPADM

## 2023-04-25 RX ORDER — THIAMINE MONONITRATE (VIT B1) 100 MG
100 TABLET ORAL DAILY
Qty: 30 TABLET | Refills: 0 | Status: SHIPPED | OUTPATIENT
Start: 2023-04-26

## 2023-04-25 RX ORDER — PANTOPRAZOLE SODIUM 40 MG/1
40 TABLET, DELAYED RELEASE ORAL
Qty: 30 TABLET | Refills: 0 | Status: SHIPPED | OUTPATIENT
Start: 2023-04-25

## 2023-04-25 RX ORDER — BLOOD-GLUCOSE METER
1 KIT MISCELLANEOUS DAILY
Qty: 1 KIT | Refills: 0 | Status: SHIPPED | OUTPATIENT
Start: 2023-04-25

## 2023-04-25 RX ORDER — DIAZEPAM 5 MG/1
5 TABLET ORAL 2 TIMES DAILY PRN
Qty: 6 TABLET | Refills: 0 | Status: SHIPPED | OUTPATIENT
Start: 2023-04-25 | End: 2023-04-28

## 2023-04-25 RX ORDER — LISINOPRIL 10 MG/1
10 TABLET ORAL DAILY
Qty: 30 TABLET | Refills: 0 | Status: SHIPPED | OUTPATIENT
Start: 2023-04-26

## 2023-04-25 RX ORDER — TIOTROPIUM BROMIDE 18 UG/1
18 CAPSULE ORAL; RESPIRATORY (INHALATION) DAILY
Qty: 30 CAPSULE | Refills: 0 | Status: SHIPPED | OUTPATIENT
Start: 2023-04-25

## 2023-04-25 RX ORDER — GLUCOSAMINE HCL/CHONDROITIN SU 500-400 MG
CAPSULE ORAL
Qty: 500 STRIP | Refills: 0 | Status: SHIPPED | OUTPATIENT
Start: 2023-04-25

## 2023-04-25 RX ORDER — ALOGLIPTIN 12.5 MG/1
25 TABLET, FILM COATED ORAL DAILY
Status: DISCONTINUED | OUTPATIENT
Start: 2023-04-25 | End: 2023-04-25 | Stop reason: HOSPADM

## 2023-04-25 RX ORDER — ALBUTEROL SULFATE 90 UG/1
2 AEROSOL, METERED RESPIRATORY (INHALATION) 4 TIMES DAILY PRN
Qty: 1 EACH | Refills: 0 | Status: SHIPPED | OUTPATIENT
Start: 2023-04-25

## 2023-04-25 RX ADMIN — INSULIN LISPRO 1 UNITS: 100 INJECTION, SOLUTION INTRAVENOUS; SUBCUTANEOUS at 11:47

## 2023-04-25 RX ADMIN — ALOGLIPTIN 25 MG: 12.5 TABLET, FILM COATED ORAL at 11:47

## 2023-04-25 RX ADMIN — LISINOPRIL 10 MG: 10 TABLET ORAL at 08:29

## 2023-04-25 RX ADMIN — THIAMINE HCL TAB 100 MG 100 MG: 100 TAB at 08:28

## 2023-04-25 RX ADMIN — SODIUM CHLORIDE 40 MG: 9 INJECTION, SOLUTION INTRAMUSCULAR; INTRAVENOUS; SUBCUTANEOUS at 08:27

## 2023-04-25 RX ADMIN — ENOXAPARIN SODIUM 40 MG: 100 INJECTION SUBCUTANEOUS at 08:26

## 2023-04-25 RX ADMIN — LEVALBUTEROL HYDROCHLORIDE 1.25 MG: 1.25 SOLUTION RESPIRATORY (INHALATION) at 05:14

## 2023-04-25 RX ADMIN — BUDESONIDE 500 MCG: 0.5 SUSPENSION RESPIRATORY (INHALATION) at 05:14

## 2023-04-25 RX ADMIN — METOPROLOL TARTRATE 25 MG: 25 TABLET, FILM COATED ORAL at 08:29

## 2023-04-25 NOTE — DISCHARGE SUMMARY
Discharge Summary      Patient ID: Michael Ruff      Patient's PCP: Referring Not In System (Inactive)    Admit Date: 4/23/2023     Discharge Date:   4/25/2023    Admitting Provider: Tatiana Berman MD    Discharging Provider: MICHELE Sweet     Reason for this admission:   Atrial flutter  Nausea and vomiting     Discharge Diagnoses: Active Hospital Problems    Diagnosis Date Noted    EtOH dependence ([address] [F10.20] 04/24/2023    History of sleep apnea [Z86.69] 04/24/2023    Hypertensive urgency [I16.0] 04/24/2023    Type 2 diabetes mellitus, without long-term current use of insulin ([address] [E11.9] 04/24/2023    Atrial flutter ([address] [I48.92] 04/24/2023    COPD (chronic obstructive pulmonary disease) ([facility]) [J44.9]     Nausea & vomiting [R11.2]        Procedures:  CT ABDOMEN PELVIS W IV CONTRAST Additional Contrast? None   Final Result      1. No acute intra-abdominopelvic abnormality. XR CHEST PORTABLE   Final Result      No acute disease               Consults:   IP CONSULT TO SOCIAL WORK  IP CONSULT TO SPIRITUAL SERVICES  IP CONSULT TO DIETITIAN  PT/OT    Briefly:   Michael Ruff who is a 51 yo male with PMH of uncontrolled BP, DM, MIGUEL, ETOH dependence, COPD who hasn't been seen by a healthcare provider since 2008 who presented due to nausea, vomiting, and desire to quit drinking alcohol. Patient reports he has been diagnosed with DM, HTN, MIGUEL but has gone untreated and not seen any provider in about 15 years for unclear reasons. Initially there was slight concern for possible pancreatitis with lipase 117 which quickly trended to normal, however patient denies abdominal pain stating that was never his complaint and he felt his nausea/vomiting was related to phlegm in setting of his untreated COPD. CT abd/pelvis negative. On arrival to the ER he was found to be in atrial flutter with HR in 170s. Ethanol level positive on arrival. BP 160s/110s. A1C 9.  He received adenosine and diltiazem in the

## 2023-04-25 NOTE — PLAN OF CARE
Problem: Nutrition Deficit:  Goal: Optimize nutritional status  4/25/2023 1127 by Terell Haas RN  Outcome: Progressing  4/24/2023 2341 by Latoya Baeza RN  Outcome: Progressing

## 2023-04-25 NOTE — PROGRESS NOTES
[address] [place] [address] Med Surg  [address] [facility] [address]  Phone: [phone]             April 25, 2023    Patient: Bill Blanca [facility]   YOB: 1970   Date of Visit: 4/23/2023       To Whom It May Concern:    Wilberto Howard was seen and treated in our facility  beginning 4/23/2023 until 4/25/2023. He may return to work on 5/1/2023 .       Sincerely,       MICHELE Jennings         Signature:__________________________________

## 2023-04-25 NOTE — PLAN OF CARE
Problem: Chronic Conditions and Co-morbidities  Goal: Patient's chronic conditions and co-morbidity symptoms are monitored and maintained or improved  Outcome: Progressing     Problem: Nutrition Deficit:  Goal: Optimize nutritional status  Outcome: Progressing

## 2023-04-25 NOTE — FLOWSHEET NOTE
04/25/23 0822   Assessment   Charting Type Shift assessment   Psychosocial   Psychosocial (WDL) WDL   Neurological   Neuro (WDL) WDL   Level of Consciousness 0   [place] Coma Scale   Eye Opening 4   Best Verbal Response 5   Best Motor Response 6   [place] Coma Scale Score 15   HEENT (Head, Ears, Eyes, Nose, & Throat)   Teeth Missing teeth   Respiratory   Respiratory (WDL) X   Respiratory Pattern Regular   Respiratory Depth Normal   Respiratory Quality/Effort Unlabored   Chest Assessment Chest expansion symmetrical   Breath Sounds   Right Upper Lobe Clear   Right Middle Lobe Clear   Right Lower Lobe Expiratory Wheezes   Left Upper Lobe Clear   Left Lower Lobe Expiratory Wheezes   Cardiac   Cardiac (WDL) WDL   Cardiac Monitor   Telemetry Box Number    Alarm Audible Yes   Alarms Set No   Electrodes Replaced No   Bedside Monitor Alarm Parameters    Gastrointestinal   Abdominal (WDL) WDL   RUQ Bowel Sounds Active   LUQ Bowel Sounds Active   RLQ Bowel Sounds Active   LLQ Bowel Sounds Active   Genitourinary   Genitourinary (WDL) WDL   Peripheral Vascular   Edema None   Skin Integumentary    Skin Integumentary (WDL) WDL   Musculoskeletal   Musculoskeletal (WDL) WDL

## 2023-04-25 NOTE — FLOWSHEET NOTE
04/24/23 2324   Assessment   Charting Type Shift assessment   Psychosocial   Psychosocial (WDL) WDL   Neurological   Neuro (WDL) WDL   Level of Consciousness 0   [place] Coma Scale   Eye Opening 4   Best Verbal Response 5   Best Motor Response 6   [place] Coma Scale Score 15   HEENT (Head, Ears, Eyes, Nose, & Throat)   Teeth Missing teeth   Respiratory   Respiratory (WDL) X   Respiratory Pattern Regular   Respiratory Depth Normal   Respiratory Quality/Effort Unlabored   Chest Assessment Chest expansion symmetrical   Breath Sounds   Right Upper Lobe Clear   Right Middle Lobe Clear   Right Lower Lobe Expiratory Wheezes   Left Upper Lobe Clear   Left Lower Lobe Expiratory Wheezes   Cardiac   Cardiac (WDL) WDL   Gastrointestinal   Abdominal (WDL) WDL   RUQ Bowel Sounds Active   LUQ Bowel Sounds Active   RLQ Bowel Sounds Active   LLQ Bowel Sounds Active   Genitourinary   Genitourinary (WDL) WDL   Peripheral Vascular   Edema None   Skin Integumentary    Skin Integumentary (WDL) WDL   Musculoskeletal   Musculoskeletal (WDL) WDL

## 2023-04-25 NOTE — PLAN OF CARE
Problem: Discharge Planning  Goal: Discharge to home or other facility with appropriate resources  Outcome: Completed     Problem: Safety - Adult  Goal: Free from fall injury  Outcome: Completed     Problem: Chronic Conditions and Co-morbidities  Goal: Patient's chronic conditions and co-morbidity symptoms are monitored and maintained or improved  4/25/2023 1322 by Justin Chaudhary RN  Outcome: Completed  4/24/2023 2341 by Dash Daniels RN  Outcome: Progressing     Problem: Nutrition Deficit:  Goal: Optimize nutritional status  4/25/2023 1322 by Justin Chaudhary RN  Outcome: Completed  4/25/2023 1127 by Justin Chaudhary RN  Outcome: Progressing  4/24/2023 2341 by Dash Daniels RN  Outcome: Progressing     Problem: Nutrition Deficit:  Goal: Optimize nutritional status  4/25/2023 1322 by Justin Chaudhary RN  Outcome: Completed  4/25/2023 1127 by Justin Chaudhary RN  Outcome: Progressing  4/24/2023 2341 by Dash Daniels RN  Outcome: Progressing

## 2024-07-19 ENCOUNTER — APPOINTMENT (OUTPATIENT)
Dept: GENERAL RADIOLOGY | Facility: HOSPITAL | Age: 54
End: 2024-07-19

## 2024-07-19 ENCOUNTER — HOSPITAL ENCOUNTER (INPATIENT)
Facility: HOSPITAL | Age: 54
LOS: 3 days | Discharge: HOME | End: 2024-07-22
Attending: EMERGENCY MEDICINE | Admitting: INTERNAL MEDICINE

## 2024-07-19 DIAGNOSIS — R60.1 ANASARCA: ICD-10-CM

## 2024-07-19 DIAGNOSIS — I48.91 ATRIAL FIBRILLATION WITH RVR: Primary | ICD-10-CM

## 2024-07-19 PROBLEM — E11.9 TYPE 2 DIABETES MELLITUS, WITHOUT LONG-TERM CURRENT USE OF INSULIN: Status: ACTIVE | Noted: 2024-07-19

## 2024-07-19 PROBLEM — E83.42 HYPOMAGNESEMIA: Status: ACTIVE | Noted: 2024-07-19

## 2024-07-19 PROBLEM — F10.10 CHRONIC ALCOHOL ABUSE: Status: ACTIVE | Noted: 2024-07-19

## 2024-07-19 LAB
ALBUMIN SERPL-MCNC: 3.9 G/DL (ref 3.5–5.2)
ALBUMIN/GLOB SERPL: 1.1 G/DL
ALP SERPL-CCNC: 109 U/L (ref 39–117)
ALT SERPL W P-5'-P-CCNC: 54 U/L (ref 1–41)
ANION GAP SERPL CALCULATED.3IONS-SCNC: 16.3 MMOL/L (ref 5–15)
AST SERPL-CCNC: 72 U/L (ref 1–40)
BASOPHILS # BLD AUTO: 0.09 10*3/MM3 (ref 0–0.2)
BASOPHILS NFR BLD AUTO: 1.6 % (ref 0–1.5)
BILIRUB SERPL-MCNC: 0.9 MG/DL (ref 0–1.2)
BUN SERPL-MCNC: 6 MG/DL (ref 6–20)
BUN/CREAT SERPL: 8.5 (ref 7–25)
CALCIUM SPEC-SCNC: 8.8 MG/DL (ref 8.6–10.5)
CHLORIDE SERPL-SCNC: 94 MMOL/L (ref 98–107)
CO2 SERPL-SCNC: 22.7 MMOL/L (ref 22–29)
CREAT SERPL-MCNC: 0.71 MG/DL (ref 0.76–1.27)
D-LACTATE SERPL-SCNC: 2.7 MMOL/L (ref 0.5–2)
D-LACTATE SERPL-SCNC: 2.9 MMOL/L (ref 0.5–2)
DEPRECATED RDW RBC AUTO: 43.8 FL (ref 37–54)
EGFRCR SERPLBLD CKD-EPI 2021: 111.1 ML/MIN/1.73
EOSINOPHIL # BLD AUTO: 0.06 10*3/MM3 (ref 0–0.4)
EOSINOPHIL NFR BLD AUTO: 1.1 % (ref 0.3–6.2)
ERYTHROCYTE [DISTWIDTH] IN BLOOD BY AUTOMATED COUNT: 12 % (ref 12.3–15.4)
GEN 5 2HR TROPONIN T REFLEX: 45 NG/L
GLOBULIN UR ELPH-MCNC: 3.4 GM/DL
GLUCOSE SERPL-MCNC: 229 MG/DL (ref 65–99)
HBA1C MFR BLD: 9 % (ref 4.8–5.6)
HCT VFR BLD AUTO: 42.5 % (ref 37.5–51)
HGB BLD-MCNC: 14.9 G/DL (ref 13–17.7)
HOLD SPECIMEN: NORMAL
HOLD SPECIMEN: NORMAL
IMM GRANULOCYTES # BLD AUTO: 0.03 10*3/MM3 (ref 0–0.05)
IMM GRANULOCYTES NFR BLD AUTO: 0.5 % (ref 0–0.5)
LYMPHOCYTES # BLD AUTO: 0.68 10*3/MM3 (ref 0.7–3.1)
LYMPHOCYTES NFR BLD AUTO: 12 % (ref 19.6–45.3)
MAGNESIUM SERPL-MCNC: 1.4 MG/DL (ref 1.6–2.6)
MCH RBC QN AUTO: 34.7 PG (ref 26.6–33)
MCHC RBC AUTO-ENTMCNC: 35.1 G/DL (ref 31.5–35.7)
MCV RBC AUTO: 98.8 FL (ref 79–97)
MONOCYTES # BLD AUTO: 0.53 10*3/MM3 (ref 0.1–0.9)
MONOCYTES NFR BLD AUTO: 9.3 % (ref 5–12)
NEUTROPHILS NFR BLD AUTO: 4.29 10*3/MM3 (ref 1.7–7)
NEUTROPHILS NFR BLD AUTO: 75.5 % (ref 42.7–76)
NRBC BLD AUTO-RTO: 0 /100 WBC (ref 0–0.2)
NT-PROBNP SERPL-MCNC: 603 PG/ML (ref 0–900)
PLATELET # BLD AUTO: 195 10*3/MM3 (ref 140–450)
PMV BLD AUTO: 9.5 FL (ref 6–12)
POTASSIUM SERPL-SCNC: 4.2 MMOL/L (ref 3.5–5.2)
PROT SERPL-MCNC: 7.3 G/DL (ref 6–8.5)
RBC # BLD AUTO: 4.3 10*6/MM3 (ref 4.14–5.8)
SODIUM SERPL-SCNC: 133 MMOL/L (ref 136–145)
TROPONIN T DELTA: -4 NG/L
TROPONIN T SERPL HS-MCNC: 49 NG/L
WBC NRBC COR # BLD AUTO: 5.68 10*3/MM3 (ref 3.4–10.8)
WHOLE BLOOD HOLD COAG: NORMAL
WHOLE BLOOD HOLD SPECIMEN: NORMAL

## 2024-07-19 PROCEDURE — 93005 ELECTROCARDIOGRAM TRACING: CPT | Performed by: EMERGENCY MEDICINE

## 2024-07-19 PROCEDURE — 25010000002 FUROSEMIDE PER 20 MG: Performed by: EMERGENCY MEDICINE

## 2024-07-19 PROCEDURE — 63710000001 INSULIN GLARGINE PER 5 UNITS: Performed by: INTERNAL MEDICINE

## 2024-07-19 PROCEDURE — 36415 COLL VENOUS BLD VENIPUNCTURE: CPT

## 2024-07-19 PROCEDURE — 25010000002 THIAMINE PER 100 MG: Performed by: EMERGENCY MEDICINE

## 2024-07-19 PROCEDURE — 63710000001 INSULIN LISPRO (HUMAN) PER 5 UNITS: Performed by: INTERNAL MEDICINE

## 2024-07-19 PROCEDURE — 71045 X-RAY EXAM CHEST 1 VIEW: CPT

## 2024-07-19 PROCEDURE — 83880 ASSAY OF NATRIURETIC PEPTIDE: CPT | Performed by: EMERGENCY MEDICINE

## 2024-07-19 PROCEDURE — 25010000002 LORAZEPAM PER 2 MG: Performed by: EMERGENCY MEDICINE

## 2024-07-19 PROCEDURE — 83605 ASSAY OF LACTIC ACID: CPT | Performed by: EMERGENCY MEDICINE

## 2024-07-19 PROCEDURE — 25010000002 MAGNESIUM SULFATE 2 GM/50ML SOLUTION: Performed by: EMERGENCY MEDICINE

## 2024-07-19 PROCEDURE — 80053 COMPREHEN METABOLIC PANEL: CPT | Performed by: EMERGENCY MEDICINE

## 2024-07-19 PROCEDURE — 99223 1ST HOSP IP/OBS HIGH 75: CPT | Performed by: INTERNAL MEDICINE

## 2024-07-19 PROCEDURE — 82948 REAGENT STRIP/BLOOD GLUCOSE: CPT

## 2024-07-19 PROCEDURE — 83036 HEMOGLOBIN GLYCOSYLATED A1C: CPT | Performed by: INTERNAL MEDICINE

## 2024-07-19 PROCEDURE — 84484 ASSAY OF TROPONIN QUANT: CPT | Performed by: EMERGENCY MEDICINE

## 2024-07-19 PROCEDURE — 99291 CRITICAL CARE FIRST HOUR: CPT

## 2024-07-19 PROCEDURE — 85025 COMPLETE CBC W/AUTO DIFF WBC: CPT | Performed by: EMERGENCY MEDICINE

## 2024-07-19 PROCEDURE — 83735 ASSAY OF MAGNESIUM: CPT | Performed by: EMERGENCY MEDICINE

## 2024-07-19 PROCEDURE — 92960 CARDIOVERSION ELECTRIC EXT: CPT

## 2024-07-19 RX ORDER — CLONIDINE HYDROCHLORIDE 0.1 MG/1
0.1 TABLET ORAL ONCE
Status: DISCONTINUED | OUTPATIENT
Start: 2024-07-19 | End: 2024-07-19

## 2024-07-19 RX ORDER — SODIUM CHLORIDE 0.9 % (FLUSH) 0.9 %
10 SYRINGE (ML) INJECTION AS NEEDED
Status: DISCONTINUED | OUTPATIENT
Start: 2024-07-19 | End: 2024-07-22 | Stop reason: HOSPADM

## 2024-07-19 RX ORDER — DILTIAZEM HYDROCHLORIDE 5 MG/ML
20 INJECTION INTRAVENOUS ONCE
Status: COMPLETED | OUTPATIENT
Start: 2024-07-19 | End: 2024-07-19

## 2024-07-19 RX ORDER — ASPIRIN 325 MG
325 TABLET ORAL ONCE
Status: DISCONTINUED | OUTPATIENT
Start: 2024-07-19 | End: 2024-07-19

## 2024-07-19 RX ORDER — LORAZEPAM 2 MG/ML
2 INJECTION INTRAMUSCULAR
Status: DISCONTINUED | OUTPATIENT
Start: 2024-07-19 | End: 2024-07-22 | Stop reason: HOSPADM

## 2024-07-19 RX ORDER — FUROSEMIDE 10 MG/ML
40 INJECTION INTRAMUSCULAR; INTRAVENOUS
Status: DISCONTINUED | OUTPATIENT
Start: 2024-07-20 | End: 2024-07-21

## 2024-07-19 RX ORDER — LORAZEPAM 2 MG/ML
1 INJECTION INTRAMUSCULAR
Status: DISCONTINUED | OUTPATIENT
Start: 2024-07-19 | End: 2024-07-22 | Stop reason: HOSPADM

## 2024-07-19 RX ORDER — POLYETHYLENE GLYCOL 3350 17 G/17G
17 POWDER, FOR SOLUTION ORAL DAILY PRN
Status: DISCONTINUED | OUTPATIENT
Start: 2024-07-19 | End: 2024-07-22 | Stop reason: HOSPADM

## 2024-07-19 RX ORDER — DEXTROSE MONOHYDRATE 25 G/50ML
10-50 INJECTION, SOLUTION INTRAVENOUS
Status: DISCONTINUED | OUTPATIENT
Start: 2024-07-19 | End: 2024-07-20

## 2024-07-19 RX ORDER — IBUPROFEN 600 MG/1
1 TABLET ORAL
Status: DISCONTINUED | OUTPATIENT
Start: 2024-07-19 | End: 2024-07-20

## 2024-07-19 RX ORDER — ONDANSETRON 2 MG/ML
4 INJECTION INTRAMUSCULAR; INTRAVENOUS EVERY 6 HOURS PRN
Status: DISCONTINUED | OUTPATIENT
Start: 2024-07-19 | End: 2024-07-22 | Stop reason: HOSPADM

## 2024-07-19 RX ORDER — INSULIN LISPRO 100 [IU]/ML
1-200 INJECTION, SOLUTION INTRAVENOUS; SUBCUTANEOUS AS NEEDED
Status: DISCONTINUED | OUTPATIENT
Start: 2024-07-19 | End: 2024-07-20

## 2024-07-19 RX ORDER — BISACODYL 10 MG
10 SUPPOSITORY, RECTAL RECTAL DAILY PRN
Status: DISCONTINUED | OUTPATIENT
Start: 2024-07-19 | End: 2024-07-22 | Stop reason: HOSPADM

## 2024-07-19 RX ORDER — LORAZEPAM 0.5 MG/1
1 TABLET ORAL DAILY
Status: DISCONTINUED | OUTPATIENT
Start: 2024-07-23 | End: 2024-07-22 | Stop reason: HOSPADM

## 2024-07-19 RX ORDER — BISACODYL 5 MG/1
5 TABLET, DELAYED RELEASE ORAL DAILY PRN
Status: DISCONTINUED | OUTPATIENT
Start: 2024-07-19 | End: 2024-07-22 | Stop reason: HOSPADM

## 2024-07-19 RX ORDER — ACETAMINOPHEN 160 MG/5ML
650 SOLUTION ORAL EVERY 4 HOURS PRN
Status: DISCONTINUED | OUTPATIENT
Start: 2024-07-19 | End: 2024-07-22 | Stop reason: HOSPADM

## 2024-07-19 RX ORDER — ACETAMINOPHEN 650 MG/1
650 SUPPOSITORY RECTAL EVERY 4 HOURS PRN
Status: DISCONTINUED | OUTPATIENT
Start: 2024-07-19 | End: 2024-07-22 | Stop reason: HOSPADM

## 2024-07-19 RX ORDER — LISINOPRIL 10 MG/1
1 TABLET ORAL DAILY
COMMUNITY
End: 2024-07-22 | Stop reason: HOSPADM

## 2024-07-19 RX ORDER — ETOMIDATE 2 MG/ML
10 INJECTION INTRAVENOUS ONCE
Status: COMPLETED | OUTPATIENT
Start: 2024-07-19 | End: 2024-07-19

## 2024-07-19 RX ORDER — CHLORDIAZEPOXIDE HYDROCHLORIDE 25 MG/1
50 CAPSULE, GELATIN COATED ORAL ONCE
Status: COMPLETED | OUTPATIENT
Start: 2024-07-19 | End: 2024-07-19

## 2024-07-19 RX ORDER — LORAZEPAM 0.5 MG/1
1 TABLET ORAL EVERY 12 HOURS SCHEDULED
Status: COMPLETED | OUTPATIENT
Start: 2024-07-21 | End: 2024-07-22

## 2024-07-19 RX ORDER — LORAZEPAM 0.5 MG/1
1 TABLET ORAL EVERY 6 HOURS
Status: COMPLETED | OUTPATIENT
Start: 2024-07-20 | End: 2024-07-21

## 2024-07-19 RX ORDER — LORAZEPAM 2 MG/1
2 TABLET ORAL
Status: DISCONTINUED | OUTPATIENT
Start: 2024-07-19 | End: 2024-07-22 | Stop reason: HOSPADM

## 2024-07-19 RX ORDER — GABAPENTIN 100 MG/1
100 CAPSULE ORAL 3 TIMES DAILY
Status: DISCONTINUED | OUTPATIENT
Start: 2024-07-19 | End: 2024-07-22 | Stop reason: HOSPADM

## 2024-07-19 RX ORDER — GABAPENTIN 100 MG/1
100 CAPSULE ORAL 3 TIMES DAILY
COMMUNITY
End: 2024-07-22 | Stop reason: HOSPADM

## 2024-07-19 RX ORDER — FUROSEMIDE 10 MG/ML
80 INJECTION INTRAMUSCULAR; INTRAVENOUS ONCE
Status: COMPLETED | OUTPATIENT
Start: 2024-07-19 | End: 2024-07-19

## 2024-07-19 RX ORDER — FOLIC ACID 1 MG/1
1 TABLET ORAL DAILY
Status: DISCONTINUED | OUTPATIENT
Start: 2024-07-19 | End: 2024-07-22 | Stop reason: HOSPADM

## 2024-07-19 RX ORDER — NICOTINE POLACRILEX 4 MG
15 LOZENGE BUCCAL
Status: DISCONTINUED | OUTPATIENT
Start: 2024-07-19 | End: 2024-07-20

## 2024-07-19 RX ORDER — AMOXICILLIN 250 MG
2 CAPSULE ORAL 2 TIMES DAILY
Status: DISCONTINUED | OUTPATIENT
Start: 2024-07-19 | End: 2024-07-22 | Stop reason: HOSPADM

## 2024-07-19 RX ORDER — INSULIN LISPRO 100 [IU]/ML
1-200 INJECTION, SOLUTION INTRAVENOUS; SUBCUTANEOUS
Status: DISCONTINUED | OUTPATIENT
Start: 2024-07-19 | End: 2024-07-20

## 2024-07-19 RX ORDER — LISINOPRIL 10 MG/1
10 TABLET ORAL DAILY
Status: DISCONTINUED | OUTPATIENT
Start: 2024-07-20 | End: 2024-07-22 | Stop reason: HOSPADM

## 2024-07-19 RX ORDER — MAGNESIUM SULFATE HEPTAHYDRATE 40 MG/ML
2 INJECTION, SOLUTION INTRAVENOUS ONCE
Status: COMPLETED | OUTPATIENT
Start: 2024-07-19 | End: 2024-07-19

## 2024-07-19 RX ORDER — THIAMINE HYDROCHLORIDE 100 MG/ML
200 INJECTION, SOLUTION INTRAMUSCULAR; INTRAVENOUS EVERY 8 HOURS SCHEDULED
Status: DISCONTINUED | OUTPATIENT
Start: 2024-07-19 | End: 2024-07-22 | Stop reason: HOSPADM

## 2024-07-19 RX ORDER — SODIUM CHLORIDE 9 MG/ML
40 INJECTION, SOLUTION INTRAVENOUS AS NEEDED
Status: DISCONTINUED | OUTPATIENT
Start: 2024-07-19 | End: 2024-07-22 | Stop reason: HOSPADM

## 2024-07-19 RX ORDER — ACETAMINOPHEN 325 MG/1
650 TABLET ORAL EVERY 4 HOURS PRN
Status: DISCONTINUED | OUTPATIENT
Start: 2024-07-19 | End: 2024-07-22 | Stop reason: HOSPADM

## 2024-07-19 RX ORDER — LORAZEPAM 2 MG/1
2 TABLET ORAL EVERY 6 HOURS
Status: COMPLETED | OUTPATIENT
Start: 2024-07-19 | End: 2024-07-20

## 2024-07-19 RX ORDER — LORAZEPAM 0.5 MG/1
1 TABLET ORAL
Status: DISCONTINUED | OUTPATIENT
Start: 2024-07-19 | End: 2024-07-22 | Stop reason: HOSPADM

## 2024-07-19 RX ORDER — LISINOPRIL 20 MG/1
20 TABLET ORAL ONCE
Status: COMPLETED | OUTPATIENT
Start: 2024-07-19 | End: 2024-07-19

## 2024-07-19 RX ORDER — SODIUM CHLORIDE 0.9 % (FLUSH) 0.9 %
10 SYRINGE (ML) INJECTION EVERY 12 HOURS SCHEDULED
Status: DISCONTINUED | OUTPATIENT
Start: 2024-07-19 | End: 2024-07-22 | Stop reason: HOSPADM

## 2024-07-19 RX ADMIN — CHLORDIAZEPOXIDE HYDROCHLORIDE 50 MG: 25 CAPSULE ORAL at 20:16

## 2024-07-19 RX ADMIN — LORAZEPAM 2 MG: 2 TABLET ORAL at 20:16

## 2024-07-19 RX ADMIN — INSULIN LISPRO 1 UNITS: 100 INJECTION, SOLUTION INTRAVENOUS; SUBCUTANEOUS at 21:44

## 2024-07-19 RX ADMIN — SENNOSIDES AND DOCUSATE SODIUM 2 TABLET: 50; 8.6 TABLET ORAL at 21:44

## 2024-07-19 RX ADMIN — MAGNESIUM SULFATE HEPTAHYDRATE 2 G: 40 INJECTION, SOLUTION INTRAVENOUS at 18:59

## 2024-07-19 RX ADMIN — LORAZEPAM 1 MG: 2 INJECTION, SOLUTION INTRAMUSCULAR; INTRAVENOUS at 21:45

## 2024-07-19 RX ADMIN — Medication 10 ML: at 21:45

## 2024-07-19 RX ADMIN — ETOMIDATE 10 MG: 2 INJECTION, SOLUTION INTRAVENOUS at 19:12

## 2024-07-19 RX ADMIN — METOPROLOL TARTRATE 25 MG: 25 TABLET, FILM COATED ORAL at 21:44

## 2024-07-19 RX ADMIN — FOLIC ACID 1 MG: 1 TABLET ORAL at 20:16

## 2024-07-19 RX ADMIN — LISINOPRIL 20 MG: 20 TABLET ORAL at 20:06

## 2024-07-19 RX ADMIN — APIXABAN 5 MG: 5 TABLET, FILM COATED ORAL at 21:44

## 2024-07-19 RX ADMIN — INSULIN GLARGINE 18 UNITS: 100 INJECTION, SOLUTION SUBCUTANEOUS at 21:45

## 2024-07-19 RX ADMIN — THIAMINE HYDROCHLORIDE 200 MG: 200 INJECTION, SOLUTION INTRAMUSCULAR; INTRAVENOUS at 21:47

## 2024-07-19 RX ADMIN — FUROSEMIDE 80 MG: 10 INJECTION, SOLUTION INTRAMUSCULAR; INTRAVENOUS at 18:59

## 2024-07-19 RX ADMIN — GABAPENTIN 100 MG: 100 CAPSULE ORAL at 21:44

## 2024-07-19 RX ADMIN — DILTIAZEM HYDROCHLORIDE 20 MG: 5 INJECTION, SOLUTION INTRAVENOUS at 18:18

## 2024-07-19 NOTE — Clinical Note
Level of Care: Telemetry [5]   Diagnosis: Atrial fibrillation with RVR [[id number]]   Admitting Physician: JEFFREY DIEHL [[id number]   Attending Physician: JEFFREY DIEHL [[id number]]   Certification: I Certify That Inpatient Hospital Services Are Medically Necessary For Greater Than 2 Midnights

## 2024-07-19 NOTE — ED PROVIDER NOTES
EMERGENCY DEPARTMENT ENCOUNTER    Pt Name: Cristi Schaffer  MRN: [id number]  Pt :   1970  Room Number:    Date of encounter:  2024  PCP: Provider, No Known  ED Provider: Enmanuel Martínez MD    Historian: Patient      HPI:  Chief Complaint   Patient presents with    Shortness of Breath    Rapid Heart Rate          Context: Cristi Schaffer is a 53 y.o. male who presents to the ED c/o shortness of breath, present for several days getting gradually worse, as well as swelling in his legs, abdomen.  States his legs hurt from swelling so much.  The patient denies a history of heart failure, does report history of A-fib      PAST MEDICAL HISTORY  Past Medical History:   Diagnosis Date    CHF (congestive heart failure)     COPD (chronic obstructive pulmonary disease)     Diabetes mellitus     Hypertension          PAST SURGICAL HISTORY  History reviewed. No pertinent surgical history.      FAMILY HISTORY  History reviewed. No pertinent family history.      SOCIAL HISTORY  Social History     Socioeconomic History    Marital status:    Tobacco Use    Smoking status: Never     Passive exposure: Never    Smokeless tobacco: Never   Substance and Sexual Activity    Alcohol use: Yes     Alcohol/week: 28.0 standard drinks of alcohol     Types: 28 Cans of beer per week    Drug use: Defer    Sexual activity: Defer         ALLERGIES  Patient has no known allergies.        REVIEW OF SYSTEMS  Review of Systems   Constitutional:  Negative for chills and fever.   HENT:  Negative for sore throat and trouble swallowing.    Eyes:  Negative for pain and redness.   Respiratory:  Positive for shortness of breath. Negative for cough.    Cardiovascular:  Positive for chest pain, palpitations and leg swelling.   Gastrointestinal:  Negative for abdominal pain, nausea and vomiting.   Genitourinary:  Negative for dysuria and urgency.   Musculoskeletal:  Negative for back pain and neck pain.   Skin:  Negative  for rash and wound.   Neurological:  Negative for dizziness and weakness.        All systems reviewed and negative except for those discussed in HPI.       PHYSICAL EXAM    I have reviewed the triage vital signs and nursing notes.    ED Triage Vitals [24 1808]   Temp Heart Rate Resp BP SpO2   98.2 °F (36.8 °C) (!) 163 22 (!) 142/109 94 %      Temp src Heart Rate Source Patient Position BP Location FiO2 (%)   Oral Monitor -- -- --       Physical Exam  Constitutional:       Appearance: Normal appearance. He is not ill-appearing.   HENT:      Head: Normocephalic and atraumatic.      Right Ear: External ear normal.      Left Ear: External ear normal.      Nose: Nose normal.      Mouth/Throat:      Mouth: Mucous membranes are moist.      Pharynx: Oropharynx is clear.   Eyes:      Extraocular Movements: Extraocular movements intact.      Conjunctiva/sclera: Conjunctivae normal.      Pupils: Pupils are equal, round, and reactive to light.   Cardiovascular:      Rate and Rhythm: Tachycardia present. Rhythm irregularly irregular.      Pulses:           Radial pulses are 2+ on the right side and 2+ on the left side.        Femoral pulses are 2+ on the right side and 2+ on the left side.       Dorsalis pedis pulses are 2+ on the right side and 2+ on the left side.        Posterior tibial pulses are 2+ on the right side and 2+ on the left side.      Heart sounds: No murmur heard.  Pulmonary:      Effort: Pulmonary effort is normal.      Breath sounds: Normal breath sounds.   Abdominal:      General: There is no distension.      Tenderness: There is generalized abdominal tenderness. There is no guarding.      Comments: Anasarca of the abdominal wall   Musculoskeletal:         General: No swelling or deformity.      Cervical back: Normal range of motion and neck supple.      Right lower le+ Pitting Edema present.      Left lower le+ Pitting Edema present.   Skin:     General: Skin is warm and dry.      Capillary  Refill: Capillary refill takes less than 2 seconds.      Findings: No rash.   Neurological:      General: No focal deficit present.      Mental Status: He is alert and oriented to person, place, and time.            LAB RESULTS  Recent Results (from the past 24 hour(s))   Comprehensive Metabolic Panel    Collection Time: 07/19/24  6:10 PM    Specimen: Blood   Result Value Ref Range    Glucose 229 (H) 65 - 99 mg/dL    BUN 6 6 - 20 mg/dL    Creatinine 0.71 (L) 0.76 - 1.27 mg/dL    Sodium 133 (L) 136 - 145 mmol/L    Potassium 4.2 3.5 - 5.2 mmol/L    Chloride 94 (L) 98 - 107 mmol/L    CO2 22.7 22.0 - 29.0 mmol/L    Calcium 8.8 8.6 - 10.5 mg/dL    Total Protein 7.3 6.0 - 8.5 g/dL    Albumin 3.9 3.5 - 5.2 g/dL    ALT (SGPT) 54 (H) 1 - 41 U/L    AST (SGOT) 72 (H) 1 - 40 U/L    Alkaline Phosphatase 109 39 - 117 U/L    Total Bilirubin 0.9 0.0 - 1.2 mg/dL    Globulin 3.4 gm/dL    A/G Ratio 1.1 g/dL    BUN/Creatinine Ratio 8.5 7.0 - 25.0    Anion Gap 16.3 (H) 5.0 - 15.0 mmol/L    eGFR 111.1 >60.0 mL/min/1.73   High Sensitivity Troponin T    Collection Time: 07/19/24  6:10 PM    Specimen: Blood   Result Value Ref Range    HS Troponin T 49 (H) <22 ng/L   Green Top (Gel)    Collection Time: 07/19/24  6:10 PM   Result Value Ref Range    Extra Tube Hold for add-ons.    Lavender Top    Collection Time: 07/19/24  6:10 PM   Result Value Ref Range    Extra Tube hold for add-on    Gold Top - SST    Collection Time: 07/19/24  6:10 PM   Result Value Ref Range    Extra Tube Hold for add-ons.    Light Blue Top    Collection Time: 07/19/24  6:10 PM   Result Value Ref Range    Extra Tube Hold for add-ons.    CBC Auto Differential    Collection Time: 07/19/24  6:10 PM    Specimen: Blood   Result Value Ref Range    WBC 5.68 3.40 - 10.80 10*3/mm3    RBC 4.30 4.14 - 5.80 10*6/mm3    Hemoglobin 14.9 13.0 - 17.7 g/dL    Hematocrit 42.5 37.5 - 51.0 %    MCV 98.8 (H) 79.0 - 97.0 fL    MCH 34.7 (H) 26.6 - 33.0 pg    MCHC 35.1 31.5 - 35.7 g/dL    RDW  12.0 (L) 12.3 - 15.4 %    RDW-SD 43.8 37.0 - 54.0 fl    MPV 9.5 6.0 - 12.0 fL    Platelets 195 140 - 450 10*3/mm3    Neutrophil % 75.5 42.7 - 76.0 %    Lymphocyte % 12.0 (L) 19.6 - 45.3 %    Monocyte % 9.3 5.0 - 12.0 %    Eosinophil % 1.1 0.3 - 6.2 %    Basophil % 1.6 (H) 0.0 - 1.5 %    Immature Grans % 0.5 0.0 - 0.5 %    Neutrophils, Absolute 4.29 1.70 - 7.00 10*3/mm3    Lymphocytes, Absolute 0.68 (L) 0.70 - 3.10 10*3/mm3    Monocytes, Absolute 0.53 0.10 - 0.90 10*3/mm3    Eosinophils, Absolute 0.06 0.00 - 0.40 10*3/mm3    Basophils, Absolute 0.09 0.00 - 0.20 10*3/mm3    Immature Grans, Absolute 0.03 0.00 - 0.05 10*3/mm3    nRBC 0.0 0.0 - 0.2 /100 WBC   BNP    Collection Time: 07/19/24  6:10 PM    Specimen: Blood   Result Value Ref Range    proBNP 603.0 0.0 - 900.0 pg/mL   Magnesium    Collection Time: 07/19/24  6:10 PM    Specimen: Blood   Result Value Ref Range    Magnesium 1.4 (L) 1.6 - 2.6 mg/dL   Lactic Acid, Plasma    Collection Time: 07/19/24  6:16 PM    Specimen: Blood   Result Value Ref Range    Lactate 2.7 (C) 0.5 - 2.0 mmol/L       If labs were ordered, I independently reviewed the results and considered them in treating the patient.        RADIOLOGY  No Radiology Exams Resulted Within Past 24 Hours        PROCEDURES    Electrical Cardioversion    Date/Time: 7/19/2024 8:37 PM    Performed by: Enmanuel Martínez MD  Authorized by: Enmanuel Martínez MD    Consent:     Consent obtained:  Written    Consent given by:  Patient    Risks discussed:  Cutaneous burn, death, induced arrhythmia and pain    Alternatives discussed:  No treatment  Pre-procedure details:     Cardioversion basis:  Emergent    Rhythm:  Atrial fibrillation    Electrode placement:  Anterior-posterior  Attempt one:     Cardioversion mode:  Synchronous    Waveform:  Biphasic    Shock (Joules):  200    Shock outcome:  Conversion to normal sinus rhythm  Post-procedure details:     Patient status:  Alert    Procedure completion:   Tolerated well, no immediate complications  Critical Care    Performed by: Enmanuel Martínez MD  Authorized by: Enmanuel Martínez MD    Critical care provider statement:     Critical care time (minutes):  61    Critical care time was exclusive of:  Separately billable procedures and treating other patients    Critical care was necessary to treat or prevent imminent or life-threatening deterioration of the following conditions:  Cardiac failure and circulatory failure    Critical care was time spent personally by me on the following activities:  Ordering and performing treatments and interventions, ordering and review of laboratory studies, ordering and review of radiographic studies, discussions with consultants, blood draw for specimens, development of treatment plan with patient or surrogate, re-evaluation of patient's condition, review of old charts, evaluation of patient's response to treatment and examination of patient    I assumed direction of critical care for this patient from another provider in my specialty: no      Care discussed with: admitting provider    Procedural Sedation    Date/Time: 7/19/2024 8:42 PM    Performed by: Enmanuel Martínez MD  Authorized by: Enmanuel Martínez MD    Consent:     Consent obtained:  Written    Consent given by:  Patient    Risks discussed:  Allergic reaction, prolonged hypoxia resulting in organ damage, prolonged sedation necessitating reversal, dysrhythmia, inadequate sedation and respiratory compromise necessitating ventilatory assistance and intubation    Alternatives discussed:  Analgesia without sedation  Indications:     Procedure performed:  Cardioversion    Procedure necessitating sedation performed by:  Physician performing sedation    Intended level of sedation:  Moderate  Pre-sedation assessment:     NPO status caution: unable to specify NPO status      ASA classification: class 2 - patient with mild systemic disease      Mouth  opening:  3 or more finger widths    Thyromental distance:  4 finger widths    Mallampati score:  I - soft palate, uvula, fauces, pillars visible    Neck mobility: normal      Pre-sedation assessments completed and reviewed: airway patency      History of difficult intubation: no    Immediate pre-procedure details:     Reviewed: vital signs    Procedure details (see MAR for exact dosages):     Preoxygenation:  Nasal cannula    Sedation:  Etomidate    Analgesia:  None    Intra-procedure monitoring:  Blood pressure monitoring, cardiac monitor, continuous pulse oximetry, continuous capnometry, frequent LOC assessments and frequent vital sign checks    Intra-procedure events: none      Total sedation time (minutes):  23  Post-procedure details:     Attendance: Constant attendance by certified staff until patient recovered      Recovery: Patient returned to pre-procedure baseline      Estimated blood loss (see I/O flowsheets): no      Post-sedation assessments completed and reviewed: airway patency, cardiovascular function, hydration status, mental status, nausea/vomiting, pain level, respiratory function and temperature      Specimens recovered:  None    Patient is stable for discharge or admission: yes      Procedure completion:  Tolerated well, no immediate complications      Interpretations    O2 Sat: The patients oxygen saturation was 96% on Room Air.  This was independently interpreted by me as Normal    EKG: I reviewed and independently interpreted the EKG as A-fib rate of 163, normal axis, normal QT interval, ST depression in the lateral leads with T wave inversions in inferior leads    EKG: I reviewed and independently interpreted the EKG as: Repeat at 1934 sinus tach rate of 112, normal axis, normal intervals, no ST elevation, no T wave inversions    Cardiac Monitoring: I reviewed and independently interpreted the Rhythm Strip as atrial fibrillation at 160    Radiology: I ordered and independently reviewed the  above noted radiographic studies.  I viewed images of Chest Xray which showed No pulmonary process per my independent interpretation. See radiologist's dictation for official interpretation.         MEDICATIONS GIVEN IN ER    Medications   sodium chloride 0.9 % flush 10 mL (has no administration in time range)   dilTIAZem (CARDIZEM) 100 mg in 100 mL NS infusion (ADV) (0 mg/hr Intravenous Stopped 7/19/24 1915)   Magnesium Standard Dose Replacement - Follow Nurse / BPA Driven Protocol (has no administration in time range)   thiamine (B-1) injection 200 mg (has no administration in time range)     Followed by   thiamine (VITAMIN B-1) tablet 100 mg (has no administration in time range)   folic acid (FOLVITE) tablet 1 mg (has no administration in time range)   LORazepam (ATIVAN) tablet 2 mg (has no administration in time range)     Followed by   LORazepam (ATIVAN) tablet 1 mg (has no administration in time range)     Followed by   LORazepam (ATIVAN) tablet 1 mg (has no administration in time range)     Followed by   LORazepam (ATIVAN) tablet 1 mg (has no administration in time range)   LORazepam (ATIVAN) tablet 1 mg (has no administration in time range)     Or   LORazepam (ATIVAN) injection 1 mg (has no administration in time range)     Or   LORazepam (ATIVAN) tablet 2 mg (has no administration in time range)     Or   LORazepam (ATIVAN) injection 2 mg (has no administration in time range)     Or   LORazepam (ATIVAN) injection 2 mg (has no administration in time range)     Or   LORazepam (ATIVAN) injection 2 mg (has no administration in time range)   chlordiazePOXIDE (LIBRIUM) capsule 50 mg (has no administration in time range)   dilTIAZem (CARDIZEM) injection 20 mg (20 mg Intravenous Given 7/19/24 1818)   etomidate (AMIDATE) injection 10 mg (10 mg Intravenous Given 7/19/24 1912)   furosemide (LASIX) injection 80 mg (80 mg Intravenous Given 7/19/24 1859)   magnesium sulfate 2g/50 mL (PREMIX) infusion (2 g Intravenous New  Bag 7/19/24 1859)   lisinopril (PRINIVIL,ZESTRIL) tablet 20 mg (20 mg Oral Given 7/19/24 2006)         MEDICAL DECISION MAKING, PROGRESS, and CONSULTS    All labs, if obtained, have been independently reviewed by me.  All radiology studies, if obtained, have been reviewed by me and the radiologist dictating the report.  All EKG's, if obtained, have been independently viewed and interpreted by me      Discussion below represents my analysis of pertinent findings related to patient's condition, differential diagnosis, treatment plan and final disposition.      Differential diagnosis:    53-year-old male presenting to the ED with complaint of palpitations, chest pain, trouble breathing, swelling.  He is anasarcic, swelling from his legs all the way up through his abdominal wall.  He is significantly tachycardic, irregular rhythm, I think is likely in a flutter given his heart rate, he does have a history of A-fib, he is likely in heart failure, he has borderline pulse oximetry is quite short of breath, will obtain laboratory workup including troponin, BNP, EKG, chest x-ray, will provide Cardizem to try and slow his heart down although I do of some reservation about calcium channel blockers given the concern for heart failure.    Additional Sources:  External (non-ED) record review:  Discharge summary 4/25/2023 from outside hospital after admission for pancreatitis and alcohol withdrawal       Orders placed during this visit:  Orders Placed This Encounter   Procedures    XR Chest 1 View    [place] Draw    Comprehensive Metabolic Panel    High Sensitivity Troponin T    CBC Auto Differential    BNP    Lactic Acid, Plasma    Magnesium    High Sensitivity Troponin T 2Hr    STAT Lactic Acid, Reflex    NPO Diet NPO Type: Strict NPO    Undress & Gown    Procedural Sedation    Vital Signs    Continuous Pulse Oximetry    Obtain Baseline Clinical [place] Withdrawal Assessment - Ar (CIWA-Ar), Sedation Scale & Vital Signs     Clinical [place] Withdrawal Assessment (CIWA-Ar)    If CIWA-Ar Score Less Than 8 For 3 Consecutive Assessments, Monitor Every 4 Hours & Discontinue Assessment When CIWA-Ar Less Than 8 for 24 Hours    Obtain Pre & Post Sedation Scores With Every Sedative Dose - Hold For POSS Greater Than 2 or RASS of -3 or Less    Notify Provider - Withdrawal    Notify Provider of Abnormal Lab Results    Notify Provider - Vitals    Oxygen Therapy- Nasal Cannula; Titrate 1-6 LPM Per SpO2; 90 - 95%    ECG 12 Lead ED Triage Standing Order; Chest Pain    ECG 12 Lead ED Triage Standing Order; Chest Pain    ECG 12 Lead Rhythm Change    Insert Peripheral IV    Inpatient Admission    Seizure Precautions    Safety Precautions    CBC & Differential    Green Top (Gel)    Lavender Top    Gold Top - SST    Light Blue Top         Additional orders considered but not ordered:  None    ED Course:    Consultants:  Hospitalist    ED Course as of 07/19/24 2019 Fri Jul 19, 2024 1919 High Sensitivity Troponin T(!)  Troponin is elevated [CS]   1919 Magnesium(!)  Magnesium is low, patient is receiving IV magnesium [CS]   1933 Informed by nursing that the patient's heart rate is once again 160, he was having more difficulty breathing, at that point the decision was made to cardiovert the patient due to instability.  Patient was cardioverted successfully to sinus rhythm. [CS]   1954 Patient is now in sinus rhythm, however he remains anasarcic, with some respiratory difficulty, I think he likely developed new onset heart failure.  I spoke to Dr. Linotn the hospitalist agrees to evaluate the patient for admission [CS]   2018 Patient's wife did present to the bedside, she advised that the patient is a daily drinker, very heavy, this certainly raises concern for some degree of alcohol withdrawal in addition to his other findings.  CIWA protocol ordered, Librium and Ativan ordered.  I did reconsult Dr. Linton we have upgraded the patient to the ICU. [CS]       ED Course User Index  [CS] Enmanuel Martínez MD           After my consideration of clinical presentation and any laboratory/radiology studies obtained, I discussed the findings with the patient/patient representative who is in agreement with the treatment plan and the final disposition. Risks and benefits of admission was discussed     AS OF 20:11 EDT VITALS:    BP - (!) 148/105  HR - 116  TEMP - 98.2 °F (36.8 °C) (Oral)  O2 SATS - 96%    I reviewed the patients prescription monitoring report if available prior to discharge    DIAGNOSIS  Final diagnoses:   Atrial fibrillation with RVR   Anasarca         DISPOSITION  ED Disposition       ED Disposition   Decision to Admit    Condition   --    Comment   Level of Care: Telemetry [5]   Diagnosis: Atrial fibrillation with RVR [043098]   Admitting Physician: JEFFREY DIEHL [[id number]]   Attending Physician: JFEFREY DIEHL [[id number]]   Certification: I Certify That Inpatient Hospital Services Are Medically Necessary For Greater Than 2 Midnights                     Please note that portions of this document were completed with voice recognition software.        Enmanuel Martínez MD  07/19/24 8319

## 2024-07-20 LAB
ALBUMIN SERPL-MCNC: 3.8 G/DL (ref 3.5–5.2)
ALBUMIN/GLOB SERPL: 1.1 G/DL
ALP SERPL-CCNC: 104 U/L (ref 39–117)
ALT SERPL W P-5'-P-CCNC: 46 U/L (ref 1–41)
ANION GAP SERPL CALCULATED.3IONS-SCNC: 12.8 MMOL/L (ref 5–15)
AST SERPL-CCNC: 63 U/L (ref 1–40)
BILIRUB SERPL-MCNC: 1.2 MG/DL (ref 0–1.2)
BUN SERPL-MCNC: 9 MG/DL (ref 6–20)
BUN/CREAT SERPL: 13.8 (ref 7–25)
CALCIUM SPEC-SCNC: 8.7 MG/DL (ref 8.6–10.5)
CHLORIDE SERPL-SCNC: 94 MMOL/L (ref 98–107)
CHOLEST SERPL-MCNC: 145 MG/DL (ref 0–200)
CO2 SERPL-SCNC: 28.2 MMOL/L (ref 22–29)
CREAT SERPL-MCNC: 0.65 MG/DL (ref 0.76–1.27)
D-LACTATE SERPL-SCNC: 1.7 MMOL/L (ref 0.5–2)
DEPRECATED RDW RBC AUTO: 45.7 FL (ref 37–54)
EGFRCR SERPLBLD CKD-EPI 2021: 114.1 ML/MIN/1.73
ERYTHROCYTE [DISTWIDTH] IN BLOOD BY AUTOMATED COUNT: 12.2 % (ref 12.3–15.4)
GLOBULIN UR ELPH-MCNC: 3.4 GM/DL
GLUCOSE BLDC GLUCOMTR-MCNC: 108 MG/DL (ref 70–130)
GLUCOSE BLDC GLUCOMTR-MCNC: 143 MG/DL (ref 70–130)
GLUCOSE BLDC GLUCOMTR-MCNC: 190 MG/DL (ref 70–130)
GLUCOSE BLDC GLUCOMTR-MCNC: 198 MG/DL (ref 70–130)
GLUCOSE BLDC GLUCOMTR-MCNC: 198 MG/DL (ref 70–130)
GLUCOSE SERPL-MCNC: 202 MG/DL (ref 65–99)
HCT VFR BLD AUTO: 41.8 % (ref 37.5–51)
HDLC SERPL-MCNC: 96 MG/DL (ref 40–60)
HGB BLD-MCNC: 14.3 G/DL (ref 13–17.7)
IRON 24H UR-MRATE: 111 MCG/DL (ref 59–158)
IRON SATN MFR SERPL: 28 % (ref 20–50)
LDLC SERPL CALC-MCNC: 35 MG/DL (ref 0–100)
LDLC/HDLC SERPL: 0.36 {RATIO}
MAGNESIUM SERPL-MCNC: 1.7 MG/DL (ref 1.6–2.6)
MCH RBC QN AUTO: 34.5 PG (ref 26.6–33)
MCHC RBC AUTO-ENTMCNC: 34.2 G/DL (ref 31.5–35.7)
MCV RBC AUTO: 100.7 FL (ref 79–97)
PLATELET # BLD AUTO: 195 10*3/MM3 (ref 140–450)
PMV BLD AUTO: 10.2 FL (ref 6–12)
POTASSIUM SERPL-SCNC: 3.9 MMOL/L (ref 3.5–5.2)
PROT SERPL-MCNC: 7.2 G/DL (ref 6–8.5)
RBC # BLD AUTO: 4.15 10*6/MM3 (ref 4.14–5.8)
SODIUM SERPL-SCNC: 135 MMOL/L (ref 136–145)
TIBC SERPL-MCNC: 398 MCG/DL (ref 298–536)
TRANSFERRIN SERPL-MCNC: 267 MG/DL (ref 200–360)
TRIGL SERPL-MCNC: 70 MG/DL (ref 0–150)
VLDLC SERPL-MCNC: 14 MG/DL (ref 5–40)
WBC NRBC COR # BLD AUTO: 5.87 10*3/MM3 (ref 3.4–10.8)

## 2024-07-20 PROCEDURE — 83540 ASSAY OF IRON: CPT | Performed by: INTERNAL MEDICINE

## 2024-07-20 PROCEDURE — 25010000002 MAGNESIUM SULFATE 2 GM/50ML SOLUTION: Performed by: FAMILY MEDICINE

## 2024-07-20 PROCEDURE — 25010000002 MAGNESIUM SULFATE 2 GM/50ML SOLUTION: Performed by: INTERNAL MEDICINE

## 2024-07-20 PROCEDURE — 83605 ASSAY OF LACTIC ACID: CPT | Performed by: EMERGENCY MEDICINE

## 2024-07-20 PROCEDURE — 83735 ASSAY OF MAGNESIUM: CPT | Performed by: INTERNAL MEDICINE

## 2024-07-20 PROCEDURE — 63710000001 INSULIN LISPRO (HUMAN) PER 5 UNITS: Performed by: INTERNAL MEDICINE

## 2024-07-20 PROCEDURE — 63710000001 INSULIN GLARGINE PER 5 UNITS: Performed by: FAMILY MEDICINE

## 2024-07-20 PROCEDURE — 82948 REAGENT STRIP/BLOOD GLUCOSE: CPT

## 2024-07-20 PROCEDURE — 85027 COMPLETE CBC AUTOMATED: CPT | Performed by: INTERNAL MEDICINE

## 2024-07-20 PROCEDURE — 63710000001 INSULIN LISPRO (HUMAN) PER 5 UNITS: Performed by: FAMILY MEDICINE

## 2024-07-20 PROCEDURE — 25010000002 THIAMINE PER 100 MG: Performed by: EMERGENCY MEDICINE

## 2024-07-20 PROCEDURE — 99233 SBSQ HOSP IP/OBS HIGH 50: CPT | Performed by: FAMILY MEDICINE

## 2024-07-20 PROCEDURE — 25010000002 FUROSEMIDE PER 20 MG: Performed by: INTERNAL MEDICINE

## 2024-07-20 PROCEDURE — 80053 COMPREHEN METABOLIC PANEL: CPT | Performed by: INTERNAL MEDICINE

## 2024-07-20 PROCEDURE — 80061 LIPID PANEL: CPT | Performed by: INTERNAL MEDICINE

## 2024-07-20 PROCEDURE — 99222 1ST HOSP IP/OBS MODERATE 55: CPT | Performed by: INTERNAL MEDICINE

## 2024-07-20 PROCEDURE — 84466 ASSAY OF TRANSFERRIN: CPT | Performed by: INTERNAL MEDICINE

## 2024-07-20 RX ORDER — INSULIN LISPRO 100 [IU]/ML
8 INJECTION, SOLUTION INTRAVENOUS; SUBCUTANEOUS
Status: DISCONTINUED | OUTPATIENT
Start: 2024-07-21 | End: 2024-07-21

## 2024-07-20 RX ORDER — NICOTINE POLACRILEX 4 MG
15 LOZENGE BUCCAL
Status: DISCONTINUED | OUTPATIENT
Start: 2024-07-20 | End: 2024-07-22 | Stop reason: HOSPADM

## 2024-07-20 RX ORDER — MAGNESIUM SULFATE HEPTAHYDRATE 40 MG/ML
2 INJECTION, SOLUTION INTRAVENOUS
Status: COMPLETED | OUTPATIENT
Start: 2024-07-20 | End: 2024-07-20

## 2024-07-20 RX ORDER — MAGNESIUM SULFATE HEPTAHYDRATE 40 MG/ML
2 INJECTION, SOLUTION INTRAVENOUS ONCE
Status: COMPLETED | OUTPATIENT
Start: 2024-07-20 | End: 2024-07-20

## 2024-07-20 RX ORDER — INSULIN LISPRO 100 [IU]/ML
2-9 INJECTION, SOLUTION INTRAVENOUS; SUBCUTANEOUS
Status: DISCONTINUED | OUTPATIENT
Start: 2024-07-20 | End: 2024-07-21

## 2024-07-20 RX ORDER — INSULIN LISPRO 100 [IU]/ML
10 INJECTION, SOLUTION INTRAVENOUS; SUBCUTANEOUS
Status: DISCONTINUED | OUTPATIENT
Start: 2024-07-20 | End: 2024-07-20

## 2024-07-20 RX ORDER — SPIRONOLACTONE 25 MG/1
25 TABLET ORAL DAILY
Status: DISCONTINUED | OUTPATIENT
Start: 2024-07-20 | End: 2024-07-22 | Stop reason: HOSPADM

## 2024-07-20 RX ORDER — POTASSIUM CHLORIDE 750 MG/1
20 CAPSULE, EXTENDED RELEASE ORAL ONCE
Status: COMPLETED | OUTPATIENT
Start: 2024-07-20 | End: 2024-07-20

## 2024-07-20 RX ORDER — DEXTROSE MONOHYDRATE 25 G/50ML
25 INJECTION, SOLUTION INTRAVENOUS
Status: DISCONTINUED | OUTPATIENT
Start: 2024-07-20 | End: 2024-07-22 | Stop reason: HOSPADM

## 2024-07-20 RX ADMIN — MAGNESIUM SULFATE HEPTAHYDRATE 2 G: 40 INJECTION, SOLUTION INTRAVENOUS at 13:01

## 2024-07-20 RX ADMIN — INSULIN LISPRO 2 UNITS: 100 INJECTION, SOLUTION INTRAVENOUS; SUBCUTANEOUS at 20:36

## 2024-07-20 RX ADMIN — THIAMINE HYDROCHLORIDE 200 MG: 200 INJECTION, SOLUTION INTRAMUSCULAR; INTRAVENOUS at 06:32

## 2024-07-20 RX ADMIN — LORAZEPAM 2 MG: 2 TABLET ORAL at 15:02

## 2024-07-20 RX ADMIN — LISINOPRIL 10 MG: 10 TABLET ORAL at 09:08

## 2024-07-20 RX ADMIN — Medication 10 ML: at 09:09

## 2024-07-20 RX ADMIN — APIXABAN 5 MG: 5 TABLET, FILM COATED ORAL at 20:08

## 2024-07-20 RX ADMIN — POTASSIUM CHLORIDE 20 MEQ: 750 CAPSULE, EXTENDED RELEASE ORAL at 11:44

## 2024-07-20 RX ADMIN — MAGNESIUM SULFATE HEPTAHYDRATE 2 G: 40 INJECTION, SOLUTION INTRAVENOUS at 03:43

## 2024-07-20 RX ADMIN — Medication 10 ML: at 20:06

## 2024-07-20 RX ADMIN — INSULIN GLARGINE 15 UNITS: 100 INJECTION, SOLUTION SUBCUTANEOUS at 11:45

## 2024-07-20 RX ADMIN — INSULIN LISPRO 6 UNITS: 100 INJECTION, SOLUTION INTRAVENOUS; SUBCUTANEOUS at 09:08

## 2024-07-20 RX ADMIN — MAGNESIUM SULFATE HEPTAHYDRATE 2 G: 40 INJECTION, SOLUTION INTRAVENOUS at 06:32

## 2024-07-20 RX ADMIN — GABAPENTIN 100 MG: 100 CAPSULE ORAL at 20:08

## 2024-07-20 RX ADMIN — FOLIC ACID 1 MG: 1 TABLET ORAL at 09:07

## 2024-07-20 RX ADMIN — FUROSEMIDE 40 MG: 10 INJECTION, SOLUTION INTRAMUSCULAR; INTRAVENOUS at 17:36

## 2024-07-20 RX ADMIN — LORAZEPAM 2 MG: 2 TABLET ORAL at 09:08

## 2024-07-20 RX ADMIN — APIXABAN 5 MG: 5 TABLET, FILM COATED ORAL at 09:07

## 2024-07-20 RX ADMIN — INSULIN GLARGINE 10 UNITS: 100 INJECTION, SOLUTION SUBCUTANEOUS at 20:36

## 2024-07-20 RX ADMIN — METOPROLOL TARTRATE 25 MG: 25 TABLET, FILM COATED ORAL at 20:08

## 2024-07-20 RX ADMIN — MAGNESIUM SULFATE HEPTAHYDRATE 2 G: 40 INJECTION, SOLUTION INTRAVENOUS at 09:09

## 2024-07-20 RX ADMIN — Medication 10 ML: at 22:36

## 2024-07-20 RX ADMIN — THIAMINE HYDROCHLORIDE 200 MG: 200 INJECTION, SOLUTION INTRAMUSCULAR; INTRAVENOUS at 22:35

## 2024-07-20 RX ADMIN — LORAZEPAM 1 MG: 0.5 TABLET ORAL at 20:07

## 2024-07-20 RX ADMIN — GABAPENTIN 100 MG: 100 CAPSULE ORAL at 15:02

## 2024-07-20 RX ADMIN — GABAPENTIN 100 MG: 100 CAPSULE ORAL at 09:07

## 2024-07-20 RX ADMIN — THIAMINE HYDROCHLORIDE 200 MG: 200 INJECTION, SOLUTION INTRAMUSCULAR; INTRAVENOUS at 14:27

## 2024-07-20 RX ADMIN — INSULIN LISPRO 10 UNITS: 100 INJECTION, SOLUTION INTRAVENOUS; SUBCUTANEOUS at 11:48

## 2024-07-20 RX ADMIN — FUROSEMIDE 40 MG: 10 INJECTION, SOLUTION INTRAMUSCULAR; INTRAVENOUS at 09:07

## 2024-07-20 RX ADMIN — METOPROLOL TARTRATE 25 MG: 25 TABLET, FILM COATED ORAL at 09:08

## 2024-07-20 RX ADMIN — SPIRONOLACTONE 25 MG: 25 TABLET ORAL at 17:56

## 2024-07-20 RX ADMIN — LORAZEPAM 2 MG: 2 TABLET ORAL at 03:42

## 2024-07-20 NOTE — PLAN OF CARE
Goal Outcome Evaluation:  Plan of Care Reviewed With: patient        Progress: no change   Pt remains on 2LNC to maintain O2 sat >90%. NSR/Stach on monitor. No C/O pain. Able to get up to BSC throughout shift with 2x assist and gait belt. Pt given scheduled PO ativan for CIWA per MAR. New Cardiology consult. No other acute changes this shift.

## 2024-07-20 NOTE — CONSULTS
Date of Hospital Visit: 24  Encounter Provider: Xavier Barnett MD  Place of Service: [facility]  Patient Name: Cristi Schaffer  :1970  Referral Provider: Jovan Linton MD  Primary Care Provider: Provider, No Known    Chief complaint/Reason for Consultation: Atrial fibrillation and heart failure      History of Present Illness:  Patient is a 53 years old gentleman who according to the patient have been drinking since early childhood.  Patient has a history of atrial fibrillation for long period of time.  He is starting noticing lower extremity edema and then it started moving up up to the belly.  He started feeling short of breath also.  That was the time he decided to take medical advice.  He denies any chest pain.  He takes his medication but unfortunately when he runs out of it he does not get them refilled.    His drinking habits have been documented in the chart.  He denies any bleeding.  He denies any hemoptysis or hematemesis.      Problem List:  CARDIAC  Coronary Artery Disease:   High risk     Myocardium:   Dyspnea     Valvular:   No known valvular disease     Electrical:   Atrial fibrillation     Pericardium:   Normal      CARDIAC RISK FACTORS  Hypertension  Diabetes  Dyslipidemia  Physical Inactivity    NON-CARDIAC  Chronic alcohol abuse  Liver disease      SURGERIES  None    Past Medical History:   Diagnosis Date    CHF (congestive heart failure)     COPD (chronic obstructive pulmonary disease)     Diabetes mellitus     Hypertension        History reviewed. No pertinent surgical history.    Medications Prior to Admission   Medication Sig Dispense Refill Last Dose    apixaban (ELIQUIS) 5 MG tablet tablet Take 1 tablet by mouth 2 (Two) Times a Day.       gabapentin (NEURONTIN) 100 MG capsule Take 1 capsule by mouth 3 (Three) Times a Day.       lisinopril (PRINIVIL,ZESTRIL) 10 MG tablet Take 1 tablet by mouth Daily.          Social History     Socioeconomic History     "Marital status:    Tobacco Use    Smoking status: Never     Passive exposure: Never    Smokeless tobacco: Never   Vaping Use    Vaping status: Never Used   Substance and Sexual Activity    Alcohol use: Yes     Alcohol/week: 28.0 standard drinks of alcohol     Types: 28 Cans of beer per week    Drug use: Defer    Sexual activity: Defer       History reviewed. No pertinent family history.    REVIEW OF SYSTEMS:   Review of Systems   Respiratory:  Positive for shortness of breath.    Cardiovascular:  Positive for leg swelling.             Objective:     Vitals:    07/20/24 1500 07/20/24 1600 07/20/24 1626 07/20/24 1700   BP: 130/93 (!) 139/105  130/79   BP Location:    Right arm   Patient Position:    Lying   Pulse: 89 97  95   Resp:    17   Temp:   98.4 °F (36.9 °C)    TempSrc:   Oral    SpO2: 96% 94%  95%   Weight:       Height:           Body mass index is 32.19 kg/m².  Flowsheet Rows      Flowsheet Row First Filed Value   Admission Height 177.8 cm (70\") Documented at 07/19/2024 1808   Admission Weight 120 kg (265 lb) Documented at 07/19/2024 1808            Physical Exam     Constitutional:       General: Not in acute distress.     Appearance: Healthy appearance. Not in distress.     Neck:     JVP:Not elevated     Carotid artery: Normal    Pulmonary:      Effort: Pulmonary effort is normal.      Breath sounds: Normal breath sounds. No wheezing. No rhonchi. No rales.     Cardiovascular:      Normal rate. Regular rhythm. Normal S1. Normal S2.      Murmurs: There is mild systolic murmur.      No gallop. No click. No rub.     Abdominal:      General: Bowel sounds are normal.      Palpations: Abdomen is soft.      Tenderness: There is no abdominal tenderness.    Extremities:     Pulses:Normal radial and pedal pulses     Edema:no edema        Lab Review:                Results from last 7 days   Lab Units 07/20/24  0438   SODIUM mmol/L 135*   POTASSIUM mmol/L 3.9   CHLORIDE mmol/L 94*   CO2 mmol/L 28.2   BUN mg/dL " 9   CREATININE mg/dL 0.65*   GLUCOSE mg/dL 202*   CALCIUM mg/dL 8.7     Results from last 7 days   Lab Units 07/19/24 2022 07/19/24  1810   HSTROP T ng/L 45* 49*     Results from last 7 days   Lab Units 07/20/24  0438   WBC 10*3/mm3 5.87   HEMOGLOBIN g/dL 14.3   HEMATOCRIT % 41.8   PLATELETS 10*3/mm3 195         Results from last 7 days   Lab Units 07/20/24  0438   MAGNESIUM mg/dL 1.7     Results from last 7 days   Lab Units 07/20/24  0438   CHOLESTEROL mg/dL 145   TRIGLYCERIDES mg/dL 70   HDL CHOL mg/dL 96*   LDL CHOL mg/dL 35           EKG: Atrial fibrillation now in normal sinus rhythm    CXR: Bilateral pleural effusion       Assessment:   Alcohol related atrial fibrillation  Alcoholic cardiomyopathy  Possible liver involvement      Plan:   Will start patient on regular diuretics.  Will start patient on spironolactone for portal hypertension  Will review echocardiogram.  Will see iron profile.  Patient need to quit drinking  Patient will benefit from liver ultrasound

## 2024-07-20 NOTE — H&P
[facility]   HISTORY AND PHYSICAL      Name:  Cristi Schaffer   Age:  53 y.o.  Sex:  male  :  1970  MRN:  [id number]   Visit Number:  [id number]  Admission Date:  2024  Date Of Service:  24  Primary Care Physician:  Provider, No Known    Chief Complaint:     Shortness of breath.    History Of Presenting Illness:      Cristi Schaffer is a 53-year-old male with history of atrial fibrillation on Eliquis, hypertension, CHF of unknown type, diabetes mellitus type 2 presented to the emergency room with symptoms of shortness of breath and palpitations worsening over the last 1 week.  He also gives history of progressive abdominal and feet swelling going on for the last several weeks.  Patient unfortunately has not been compliant with medications and medical follow-up and has not seen a physician since 2023.  At that time he was admitted to [facility] for similar complaints.  He states that he takes only Eliquis, lisinopril and gabapentin.  Unfortunately, continues to drink 4 cans of beer and half a pint of tequila daily.  He lives with his son.  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  Does have chronic history of exertional shortness of breath.  Denies any chest pain.  He quit smoking 3 years ago.    In the emergency room he was afebrile but tachycardic at 163 with initial blood pressure of 140/109.  He was saturating at 94% on room air.  Patient was noted to be in atrial flutter and was given 20 mg of IV Cardizem push and 2 g of IV magnesium.  Subsequently he was placed on Cardizem drip.  It was discontinued as the patient converted back to sinus tachycardia.  Initial troponin 49, proBNP 603.  Sodium 133, glucose 229, magnesium 1.4, AST 72, ALT 54.  Lactic acid 2.7.  CBC was unremarkable.  Chest x-ray showed borderline cardiomegaly with mild increase in bronchovascular markings and nonhomogeneous opacities noted in the right lower zone  suggestive of scarring.  Patient was given 80 mg of IV Lasix and 20 mg of oral lisinopril in the emergency room.  Due to his history of alcohol abuse, alcohol withdrawal protocol with Ativan was initiated in the emergency room.    Review Of Systems:    All systems were reviewed and negative except as mentioned in history of presenting illness, assessment and plan.    Past Medical History: Patient  has a past medical history of CHF (congestive heart failure), COPD (chronic obstructive pulmonary disease), Diabetes mellitus, and Hypertension.    Past Surgical History: Patient denies any past surgical history.    Social History: Patient  reports that he has never smoked. He has never been exposed to tobacco smoke. He has never used smokeless tobacco. He reports current alcohol use of about 28.0 standard drinks of alcohol per week. Drug use questions deferred to the physician.    Family History:  Patient denies any family history of heart disease.    Allergies:      Patient has no known allergies.    Home Medications:    Prior to Admission Medications       Prescriptions Last Dose Informant Patient Reported? Taking?    apixaban (ELIQUIS) 5 MG tablet tablet   Yes No    Take 1 tablet by mouth 2 (Two) Times a Day.    gabapentin (NEURONTIN) 100 MG capsule   Yes No    Take 1 capsule by mouth 3 (Three) Times a Day.    lisinopril (PRINIVIL,ZESTRIL) 10 MG tablet   Yes No    Take 1 tablet by mouth Daily.     ED Medications:    Medications   sodium chloride 0.9 % flush 10 mL (has no administration in time range)   dilTIAZem (CARDIZEM) 100 mg in 100 mL NS infusion (ADV) (0 mg/hr Intravenous Stopped 7/19/24 1915)   Magnesium Standard Dose Replacement - Follow Nurse / BPA Driven Protocol (has no administration in time range)   thiamine (B-1) injection 200 mg (has no administration in time range)     Followed by   thiamine (VITAMIN B-1) tablet 100 mg (has no administration in time range)   folic acid (FOLVITE) tablet 1 mg (has no  "administration in time range)   LORazepam (ATIVAN) tablet 2 mg (has no administration in time range)     Followed by   LORazepam (ATIVAN) tablet 1 mg (has no administration in time range)     Followed by   LORazepam (ATIVAN) tablet 1 mg (has no administration in time range)     Followed by   LORazepam (ATIVAN) tablet 1 mg (has no administration in time range)   LORazepam (ATIVAN) tablet 1 mg (has no administration in time range)     Or   LORazepam (ATIVAN) injection 1 mg (has no administration in time range)     Or   LORazepam (ATIVAN) tablet 2 mg (has no administration in time range)     Or   LORazepam (ATIVAN) injection 2 mg (has no administration in time range)     Or   LORazepam (ATIVAN) injection 2 mg (has no administration in time range)     Or   LORazepam (ATIVAN) injection 2 mg (has no administration in time range)   chlordiazePOXIDE (LIBRIUM) capsule 50 mg (has no administration in time range)   dilTIAZem (CARDIZEM) injection 20 mg (20 mg Intravenous Given 7/19/24 1818)   etomidate (AMIDATE) injection 10 mg (10 mg Intravenous Given 7/19/24 1912)   furosemide (LASIX) injection 80 mg (80 mg Intravenous Given 7/19/24 1859)   magnesium sulfate 2g/50 mL (PREMIX) infusion (2 g Intravenous New Bag 7/19/24 1859)   lisinopril (PRINIVIL,ZESTRIL) tablet 20 mg (20 mg Oral Given 7/19/24 2006)     Vital Signs:  Temp:  [98.2 °F (36.8 °C)] 98.2 °F (36.8 °C)  Heart Rate:  [108-165] 116  Resp:  [22] 22  BP: (136-172)/(101-118) 148/105        07/19/24 1808   Weight: 120 kg (265 lb)     Body mass index is 38.02 kg/m².    Physical Exam:     Most recent vital Signs: BP (!) 148/105   Pulse 116   Temp 98.2 °F (36.8 °C) (Oral)   Resp 22   Ht 177.8 cm (70\")   Wt 120 kg (265 lb)   SpO2 96%   BMI 38.02 kg/m²     Physical Exam  Constitutional:       General: He is in acute distress.      Appearance: He is ill-appearing.      Comments: Mild distress due to dyspnea.   HENT:      Head: Normocephalic and atraumatic.      Right Ear: " External ear normal.      Left Ear: External ear normal.      Nose: Nose normal.      Mouth/Throat:      Mouth: Mucous membranes are dry.      Pharynx: Oropharynx is clear.   Eyes:      Extraocular Movements: Extraocular movements intact.      Conjunctiva/sclera: Conjunctivae normal.   Cardiovascular:      Rate and Rhythm: Regular rhythm. Tachycardia present.      Pulses: Normal pulses.      Heart sounds: Normal heart sounds. No murmur heard.  Pulmonary:      Breath sounds: Rales present. No wheezing.      Comments: Decreased air entry bilaterally.  Bilateral basal crackles heard.  No wheezing.  Abdominal:      General: Bowel sounds are normal. There is distension.      Palpations: Abdomen is soft.      Tenderness: There is no abdominal tenderness. There is no guarding or rebound.      Comments: Gaseous distention of the abdomen noted.   Musculoskeletal:      Cervical back: Neck supple.      Right lower leg: Edema present.      Left lower leg: Edema present.      Comments: 3+ pitting edema noted with hyperemia of the legs bilaterally.   Skin:     General: Skin is warm.      Findings: Erythema present. No rash.   Neurological:      General: No focal deficit present.      Mental Status: He is alert and oriented to person, place, and time. Mental status is at baseline.      Comments: Bilateral hand tremors noted.  No hallucinations or delusions.   Psychiatric:         Mood and Affect: Mood normal.         Behavior: Behavior normal.       Laboratory data:    I have reviewed the labs done in the emergency room.    Results from last 7 days   Lab Units 07/19/24  1810   SODIUM mmol/L 133*   POTASSIUM mmol/L 4.2   CHLORIDE mmol/L 94*   CO2 mmol/L 22.7   BUN mg/dL 6   CREATININE mg/dL 0.71*   CALCIUM mg/dL 8.8   BILIRUBIN mg/dL 0.9   ALK PHOS U/L 109   ALT (SGPT) U/L 54*   AST (SGOT) U/L 72*   GLUCOSE mg/dL 229*     Results from last 7 days   Lab Units 07/19/24  1810   WBC 10*3/mm3 5.68   HEMOGLOBIN g/dL 14.9   HEMATOCRIT %  42.5   PLATELETS 10*3/mm3 195         Results from last 7 days   Lab Units 07/19/24  1810   HSTROP T ng/L 49*     Results from last 7 days   Lab Units 07/19/24  1810   PROBNP pg/mL 603.0     EKG:      EKG done in the emergency room was reviewed by me.  It showed atrial flutter with a ventricular rate of 163 bpm.  Right axis deviation noted.  Inverted T waves noted in the inferior limb leads and lateral chest leads.  Poor R wave progression noted on the chest leads.  Repeat EKG done in the emergency room showed sinus tachycardia with a ventricular rate of 112 bpm.  Right axis deviation noted with improvement in the T inversions.    Radiology:    Portable chest x-ray done in the emergency room was reviewed by me.  It shows borderline cardiomegaly with increased bronchovascular markings.  Nonhomogeneous opacities noted in the right lower zone suggestive of either pulmonary edema or scarring.  An official report is currently pending.    Assessment:    Atrial flutter with rapid ventricular response, POA.  Suspected tachycardia induced cardiomyopathy, POA.  Suspected acute on chronic diastolic heart failure, POA.  Hypomagnesemia, POA.  Demand ischemia secondary to #1 with elevated troponin.  Diabetes mellitus type 2.  Chronic alcohol abuse.  Medical noncompliance.    Plan:    Atrial flutter with rapid ventricular response.  - Improved with IV Cardizem.  - We will place him on oral metoprolol 25 mg twice daily and uptitrate as needed.  - Continue apixaban.  - I have strongly advised the patient to be compliant with his medications.    Suspected congestive heart failure.  - Patient does seem to have anasarca and differential includes diastolic heart failure versus tachycardia induced cardiomyopathy.  - Obtain 2D echocardiogram.  - Continue IV Lasix 40 mg twice daily for 2 more days.  - Electrolyte replacement protocol.  - Continue lisinopril.    Demand ischemia/elevated troponin.  - Patient does have risk factors for  underlying coronary artery disease.  - Repeat troponin levels send obtain 2D echocardiogram.  - At this time there is no evidence of acute coronary syndrome.    Chronic alcohol abuse/risk of alcohol withdrawal syndrome.  - Patient will be placed on alcohol withdrawal protocol.  - Continue folic acid, thiamine and multivitamins.    Diabetes mellitus type 2.  - Patient will be placed on subcutaneous insulin protocol per Glucomander.  - Obtain hemoglobin A1c levels.  - Patient may also benefit from outpatient evaluation for sleep apnea.    I have discussed the patient's condition and treatment plan with his wife who is at the bedside.    Risk Assessment: High  DVT Prophylaxis: Apixaban  Code Status: Full  Diet: Cardiac diabetic      Jovan Linton MD  07/19/24  20:11 EDT    Dictated utilizing Dragon dictation.

## 2024-07-20 NOTE — PROGRESS NOTES
[facility]    Name:  Cristi Schaffer   Age:  53 y.o.  Sex:  male  :  1970  MRN:  [id number]   Visit Number:  [id number]  Admission Date:  2024  Date Of Service:  24  Primary Care Physician:  Provider, No Known     LOS: 1 day :    Chief Complaint:      Dyspnea    Subjective:    Patient seen and examined.  No acute complaints.  Heart rate improved.  Patient still hypertensive.    Hospital Course:    Cristi Schaffer is a 53-year-old male with history of atrial fibrillation on Eliquis, hypertension, CHF of unknown type, diabetes mellitus type 2 presented to the emergency room with symptoms of shortness of breath and palpitations worsening over the last 1 week.  He also gives history of progressive abdominal and feet swelling going on for the last several weeks.  Patient unfortunately has not been compliant with medications and medical follow-up and has not seen a physician since 2023.  At that time he was admitted to [facility] for similar complaints.  He states that he takes only Eliquis, lisinopril and gabapentin.  Unfortunately, continues to drink 4 cans of beer and half a pint of tequila daily.  He lives with his son.  He works at a local restaurant as a manager.  Does have chronic history of exertional shortness of breath.  Denies any chest pain.  He quit smoking 3 years ago.     In the emergency room he was afebrile but tachycardic at 163 with initial blood pressure of 140/109.  He was saturating at 94% on room air.  Patient was noted to be in atrial flutter and was given 20 mg of IV Cardizem push and 2 g of IV magnesium.  Subsequently he was placed on Cardizem drip.  It was discontinued as the patient converted back to sinus tachycardia.  Initial troponin 49, proBNP 603.  Sodium 133, glucose 229, magnesium 1.4, AST 72, ALT 54.  Lactic acid 2.7.  CBC was unremarkable.  Chest x-ray showed borderline cardiomegaly with  mild increase in bronchovascular markings and nonhomogeneous opacities noted in the right lower zone suggestive of scarring.  Patient was given 80 mg of IV Lasix and 20 mg of oral lisinopril in the emergency room.  Due to his history of alcohol abuse, alcohol withdrawal protocol with Ativan was initiated in the emergency room.    Review of Systems:     All systems were reviewed and negative except as mentioned in subjective, assessment and plan.    Vital Signs:    Temp:  [97.8 °F (36.6 °C)-98.6 °F (37 °C)] 98.6 °F (37 °C)  Heart Rate:  [] 103  Resp:  [20-24] 20  BP: (128-172)/() 130/102    Intake and output:    I/O last 3 completed shifts:  In: 104.8 [I.V.:104.8]  Out: 2200 [Urine:2200]  I/O this shift:  In: 240 [P.O.:240]  Out: -     Physical Examination:    General Appearance:  Alert and cooperative.  Blunted affect.  Obese   Head:  Atraumatic and normocephalic.   Eyes: Conjunctivae and sclerae normal, no icterus. No pallor.   Throat: No oral lesions, no thrush, oral mucosa moist.   Neck: Supple, trachea midline, no thyromegaly.   Lungs:   Breath sounds heard bilaterally equally.  No wheezing or crackles. No Pleural rub or bronchial breathing.   Heart:  Normal S1 and S2, no murmur, no gallop, no rub. No JVD.   Abdomen:   Normal bowel sounds, no masses, no organomegaly. Soft, nontender, nondistended, no rebound tenderness.   Extremities: Supple, 2+ pitting edema bilaterally, no cyanosis, no clubbing.   Skin: No bleeding or rash.   Neurologic: Alert and oriented x 3. No facial asymmetry. Moves all four limbs. No tremors.      Laboratory results:    Results from last 7 days   Lab Units 07/20/24  0438 07/19/24  1810   SODIUM mmol/L 135* 133*   POTASSIUM mmol/L 3.9 4.2   CHLORIDE mmol/L 94* 94*   CO2 mmol/L 28.2 22.7   BUN mg/dL 9 6   CREATININE mg/dL 0.65* 0.71*   CALCIUM mg/dL 8.7 8.8   BILIRUBIN mg/dL 1.2 0.9   ALK PHOS U/L 104 109   ALT (SGPT) U/L 46* 54*   AST (SGOT) U/L 63* 72*   GLUCOSE mg/dL 202*  "229*     Results from last 7 days   Lab Units 07/20/24  0438 07/19/24  1810   WBC 10*3/mm3 5.87 5.68   HEMOGLOBIN g/dL 14.3 14.9   HEMATOCRIT % 41.8 42.5   PLATELETS 10*3/mm3 195 195         Results from last 7 days   Lab Units 07/19/24  2022 07/19/24  1810   HSTROP T ng/L 45* 49*         No results for input(s): \"PHART\", \"IWK6FJF\", \"PO2ART\", \"YBX1QNX\", \"BASEEXCESS\" in the last 8760 hours.   I have reviewed the patient's laboratory results.    Radiology results:    No radiology results from the last 24 hrs  I have reviewed the patient's radiology reports.    Medication Review:     I have reviewed the patient's active and prn medications.     Problem List:      Atrial fibrillation with RVR    Type 2 diabetes mellitus, without long-term current use of insulin    Chronic alcohol abuse    Hypomagnesemia      Assessment:    Atrial flutter with rapid ventricular response, POA.  Suspected tachycardia induced cardiomyopathy, POA.  Suspected acute on chronic diastolic heart failure, POA.  Hypomagnesemia, POA.  Demand ischemia secondary to #1 with elevated troponin.  Diabetes mellitus type 2.  Chronic alcohol abuse.  Medical noncompliance.    Plan:    Atrial flutter with rapid ventricular response, POA.  Cardizem drip started, responded well.  Currently off.  Heart rate less than 120.  Metoprolol 25 mg p.o. daily.  Continue Eliquis.  Target magnesium greater than 2, target potassium greater than 4.  Suspected tachycardia induced cardiomyopathy, POA.  Suspected acute on chronic diastolic heart failure, POA.  Demand ischemia secondary to #1 with elevated troponin.  2D echo pending, will be reviewed.  Patient diffusely edematous, maintaining urine output.  Lasix 40 mg IV twice daily.  Transition to oral preparation for discharge.  Hypertensive urgency  Treatment as above.  Metoprolol, lisinopril, Lasix.  Goal blood pressure less than 140 systolic, less than 80 diastolic  Hypomagnesemia, POA.  Will give 1 more dose, currently " 1.7.  Chronic alcohol abuse.  CIWA.  Folic acid, thiamine, multivitamin.  No withdrawal symptoms reported.  Continue to monitor.  Diabetes mellitus type 2.  Weight-based insulin dosing.  Basal, prandial, sliding scale.  A1c 9%.  Patient likely benefit from Jardiance.  Also, likely benefit from GLP-1 therapy, follow-up with PCP outpatient to discuss.  Medical noncompliance.  Case management consulted for assistance with financing medications, patient reports this is the rate limiting factor in obtaining medications and maintaining compliance with medical therapies.    DVT Prophylaxis: Eliquis  Code Status: Full code  Diet: Cardiac, diabetic  Discharge Plan: Home in 2 to 3 days.    Carlitos Webb DO  07/20/24  10:54 EDT    Dictated utilizing Dragon dictation.

## 2024-07-21 ENCOUNTER — APPOINTMENT (OUTPATIENT)
Dept: CARDIOLOGY | Facility: HOSPITAL | Age: 54
End: 2024-07-21

## 2024-07-21 LAB
ANION GAP SERPL CALCULATED.3IONS-SCNC: 14.3 MMOL/L (ref 5–15)
BASOPHILS # BLD AUTO: 0.08 10*3/MM3 (ref 0–0.2)
BASOPHILS NFR BLD AUTO: 1.4 % (ref 0–1.5)
BUN SERPL-MCNC: 14 MG/DL (ref 6–20)
BUN/CREAT SERPL: 19.2 (ref 7–25)
CALCIUM SPEC-SCNC: 8.9 MG/DL (ref 8.6–10.5)
CHLORIDE SERPL-SCNC: 93 MMOL/L (ref 98–107)
CO2 SERPL-SCNC: 28.7 MMOL/L (ref 22–29)
CREAT SERPL-MCNC: 0.73 MG/DL (ref 0.76–1.27)
DEPRECATED RDW RBC AUTO: 46.1 FL (ref 37–54)
EGFRCR SERPLBLD CKD-EPI 2021: 110.2 ML/MIN/1.73
EOSINOPHIL # BLD AUTO: 0.14 10*3/MM3 (ref 0–0.4)
EOSINOPHIL NFR BLD AUTO: 2.4 % (ref 0.3–6.2)
ERYTHROCYTE [DISTWIDTH] IN BLOOD BY AUTOMATED COUNT: 12 % (ref 12.3–15.4)
GLUCOSE BLDC GLUCOMTR-MCNC: 123 MG/DL (ref 70–130)
GLUCOSE BLDC GLUCOMTR-MCNC: 135 MG/DL (ref 70–130)
GLUCOSE BLDC GLUCOMTR-MCNC: 79 MG/DL (ref 70–130)
GLUCOSE SERPL-MCNC: 143 MG/DL (ref 65–99)
HCT VFR BLD AUTO: 42.3 % (ref 37.5–51)
HGB BLD-MCNC: 14.1 G/DL (ref 13–17.7)
IMM GRANULOCYTES # BLD AUTO: 0.03 10*3/MM3 (ref 0–0.05)
IMM GRANULOCYTES NFR BLD AUTO: 0.5 % (ref 0–0.5)
LYMPHOCYTES # BLD AUTO: 1.04 10*3/MM3 (ref 0.7–3.1)
LYMPHOCYTES NFR BLD AUTO: 17.9 % (ref 19.6–45.3)
MAGNESIUM SERPL-MCNC: 1.9 MG/DL (ref 1.6–2.6)
MCH RBC QN AUTO: 34.6 PG (ref 26.6–33)
MCHC RBC AUTO-ENTMCNC: 33.3 G/DL (ref 31.5–35.7)
MCV RBC AUTO: 103.7 FL (ref 79–97)
MONOCYTES # BLD AUTO: 0.62 10*3/MM3 (ref 0.1–0.9)
MONOCYTES NFR BLD AUTO: 10.7 % (ref 5–12)
NEUTROPHILS NFR BLD AUTO: 3.91 10*3/MM3 (ref 1.7–7)
NEUTROPHILS NFR BLD AUTO: 67.1 % (ref 42.7–76)
NRBC BLD AUTO-RTO: 0 /100 WBC (ref 0–0.2)
PHOSPHATE SERPL-MCNC: 4.1 MG/DL (ref 2.5–4.5)
PLATELET # BLD AUTO: 199 10*3/MM3 (ref 140–450)
PMV BLD AUTO: 9.8 FL (ref 6–12)
POTASSIUM SERPL-SCNC: 4.4 MMOL/L (ref 3.5–5.2)
RBC # BLD AUTO: 4.08 10*6/MM3 (ref 4.14–5.8)
SODIUM SERPL-SCNC: 136 MMOL/L (ref 136–145)
WBC NRBC COR # BLD AUTO: 5.82 10*3/MM3 (ref 3.4–10.8)

## 2024-07-21 PROCEDURE — 25010000002 THIAMINE PER 100 MG: Performed by: EMERGENCY MEDICINE

## 2024-07-21 PROCEDURE — 99232 SBSQ HOSP IP/OBS MODERATE 35: CPT | Performed by: FAMILY MEDICINE

## 2024-07-21 PROCEDURE — 93306 TTE W/DOPPLER COMPLETE: CPT | Performed by: INTERNAL MEDICINE

## 2024-07-21 PROCEDURE — 63710000001 INSULIN GLARGINE PER 5 UNITS: Performed by: FAMILY MEDICINE

## 2024-07-21 PROCEDURE — 80048 BASIC METABOLIC PNL TOTAL CA: CPT | Performed by: FAMILY MEDICINE

## 2024-07-21 PROCEDURE — 82948 REAGENT STRIP/BLOOD GLUCOSE: CPT

## 2024-07-21 PROCEDURE — 25010000002 FUROSEMIDE PER 20 MG: Performed by: INTERNAL MEDICINE

## 2024-07-21 PROCEDURE — 85025 COMPLETE CBC W/AUTO DIFF WBC: CPT | Performed by: FAMILY MEDICINE

## 2024-07-21 PROCEDURE — 25010000002 SULFUR HEXAFLUORIDE MICROSPH 60.7-25 MG RECONSTITUTED SUSPENSION: Performed by: FAMILY MEDICINE

## 2024-07-21 PROCEDURE — 84100 ASSAY OF PHOSPHORUS: CPT | Performed by: FAMILY MEDICINE

## 2024-07-21 PROCEDURE — 83735 ASSAY OF MAGNESIUM: CPT | Performed by: FAMILY MEDICINE

## 2024-07-21 PROCEDURE — 82948 REAGENT STRIP/BLOOD GLUCOSE: CPT | Performed by: FAMILY MEDICINE

## 2024-07-21 PROCEDURE — 93306 TTE W/DOPPLER COMPLETE: CPT

## 2024-07-21 PROCEDURE — 99232 SBSQ HOSP IP/OBS MODERATE 35: CPT | Performed by: INTERNAL MEDICINE

## 2024-07-21 PROCEDURE — 63710000001 INSULIN LISPRO (HUMAN) PER 5 UNITS: Performed by: FAMILY MEDICINE

## 2024-07-21 RX ORDER — FUROSEMIDE 40 MG/1
40 TABLET ORAL
Status: DISCONTINUED | OUTPATIENT
Start: 2024-07-21 | End: 2024-07-22 | Stop reason: HOSPADM

## 2024-07-21 RX ORDER — FUROSEMIDE 20 MG/1
20 TABLET ORAL DAILY
Status: DISCONTINUED | OUTPATIENT
Start: 2024-07-21 | End: 2024-07-21

## 2024-07-21 RX ADMIN — LORAZEPAM 1 MG: 0.5 TABLET ORAL at 20:09

## 2024-07-21 RX ADMIN — GABAPENTIN 100 MG: 100 CAPSULE ORAL at 20:09

## 2024-07-21 RX ADMIN — SPIRONOLACTONE 25 MG: 25 TABLET ORAL at 08:25

## 2024-07-21 RX ADMIN — LORAZEPAM 1 MG: 0.5 TABLET ORAL at 08:25

## 2024-07-21 RX ADMIN — LORAZEPAM 1 MG: 0.5 TABLET ORAL at 13:24

## 2024-07-21 RX ADMIN — INSULIN GLARGINE 10 UNITS: 100 INJECTION, SOLUTION SUBCUTANEOUS at 08:24

## 2024-07-21 RX ADMIN — METOPROLOL TARTRATE 25 MG: 25 TABLET, FILM COATED ORAL at 20:09

## 2024-07-21 RX ADMIN — LORAZEPAM 1 MG: 0.5 TABLET ORAL at 01:53

## 2024-07-21 RX ADMIN — SULFUR HEXAFLUORIDE 2 ML: KIT at 10:52

## 2024-07-21 RX ADMIN — APIXABAN 5 MG: 5 TABLET, FILM COATED ORAL at 08:25

## 2024-07-21 RX ADMIN — INSULIN LISPRO 8 UNITS: 100 INJECTION, SOLUTION INTRAVENOUS; SUBCUTANEOUS at 08:24

## 2024-07-21 RX ADMIN — GABAPENTIN 100 MG: 100 CAPSULE ORAL at 08:25

## 2024-07-21 RX ADMIN — Medication 10 ML: at 20:09

## 2024-07-21 RX ADMIN — EMPAGLIFLOZIN 10 MG: 10 TABLET, FILM COATED ORAL at 13:24

## 2024-07-21 RX ADMIN — APIXABAN 5 MG: 5 TABLET, FILM COATED ORAL at 20:09

## 2024-07-21 RX ADMIN — FUROSEMIDE 40 MG: 40 TABLET ORAL at 18:08

## 2024-07-21 RX ADMIN — THIAMINE HYDROCHLORIDE 200 MG: 200 INJECTION, SOLUTION INTRAMUSCULAR; INTRAVENOUS at 13:24

## 2024-07-21 RX ADMIN — METOPROLOL TARTRATE 25 MG: 25 TABLET, FILM COATED ORAL at 08:25

## 2024-07-21 RX ADMIN — FUROSEMIDE 40 MG: 10 INJECTION, SOLUTION INTRAMUSCULAR; INTRAVENOUS at 08:24

## 2024-07-21 RX ADMIN — FUROSEMIDE 20 MG: 20 TABLET ORAL at 13:24

## 2024-07-21 RX ADMIN — THIAMINE HYDROCHLORIDE 200 MG: 200 INJECTION, SOLUTION INTRAMUSCULAR; INTRAVENOUS at 05:20

## 2024-07-21 RX ADMIN — Medication 10 ML: at 08:26

## 2024-07-21 RX ADMIN — Medication 10 ML: at 05:24

## 2024-07-21 RX ADMIN — GABAPENTIN 100 MG: 100 CAPSULE ORAL at 15:54

## 2024-07-21 RX ADMIN — FOLIC ACID 1 MG: 1 TABLET ORAL at 08:25

## 2024-07-21 RX ADMIN — LISINOPRIL 10 MG: 10 TABLET ORAL at 08:25

## 2024-07-21 NOTE — PLAN OF CARE
Goal Outcome Evaluation:              Outcome Evaluation: Sinus rhythm on monitor.  Pt does have significant swelling in his rosa elena lower ext's.  Emphasized multiple times pt has fluid restriction of 1500ml/day.  Compliance encourged re plan of care/treatment for CHF/A fib.

## 2024-07-21 NOTE — PROGRESS NOTES
"[facility]  IP Progress Note    HOSPITAL COURSE:  Patient has been admitted with atrial fibrillation with rapid ventricular response and due to alcoholic reasons.      CHIEF COMPLAINTS:  Shortness of breath and fast heart rate      Subjective   Feeling much better but still fluid overloaded      Objective     Blood pressure 117/82, pulse 86, temperature 98.4 °F (36.9 °C), temperature source Oral, resp. rate 18, height 180.3 cm (70.98\"), weight 106 kg (233 lb 11 oz), SpO2 95%.     Intake/Output Summary (Last 24 hours) at 7/21/2024 1056  Last data filed at 7/21/2024 1030  Gross per 24 hour   Intake 1914 ml   Output 3100 ml   Net -1186 ml       PHYSICAL EXAM:  Constitutional:       General: Not in acute distress.     Appearance: Healthy appearance. Not in distress.     Neck:     JVP:Not elevated     Carotid artery: Normal    Pulmonary:      Effort: Pulmonary effort is normal.      Breath sounds: Normal breath sounds. No wheezing. No rhonchi. No rales.     Cardiovascular:      Normal rate. Regular rhythm. Normal S1. Normal S2.      Murmurs: There is mild systolic murmur.      No gallop. No click. No rub.     Abdominal:      General: Bowel sounds are normal.      Palpations: Abdomen is soft.      Tenderness: There is no abdominal tenderness.    Extremities:     Pulses:Normal radial and pedal pulses     Edema: 2+ edema    MEDICATIONS:    apixaban, 5 mg, Oral, BID  folic acid, 1 mg, Oral, Daily  furosemide, 40 mg, Intravenous, BID  gabapentin, 100 mg, Oral, TID  insulin glargine, 10 Units, Subcutaneous, Q12H  insulin lispro, 2-9 Units, Subcutaneous, 4x Daily AC & at Bedtime  insulin lispro, 8 Units, Subcutaneous, TID With Meals  lisinopril, 10 mg, Oral, Daily  LORazepam, 1 mg, Oral, Q6H   Followed by  LORazepam, 1 mg, Oral, Q12H   Followed by  [START ON 7/23/2024] LORazepam, 1 mg, Oral, Daily  metoprolol tartrate, 25 mg, Oral, Q12H  senna-docusate sodium, 2 tablet, Oral, BID  sodium " chloride, 10 mL, Intravenous, Q12H  spironolactone, 25 mg, Oral, Daily  thiamine (B-1) IV, 200 mg, Intravenous, Q8H   Followed by  [START ON 7/25/2024] thiamine, 100 mg, Oral, Daily          Results from last 7 days   Lab Units 07/21/24  0412   WBC 10*3/mm3 5.82   HEMOGLOBIN g/dL 14.1   HEMATOCRIT % 42.3   PLATELETS 10*3/mm3 199     Results from last 7 days   Lab Units 07/21/24  0412 07/20/24  0438   SODIUM mmol/L 136 135*   POTASSIUM mmol/L 4.4 3.9   CHLORIDE mmol/L 93* 94*   CO2 mmol/L 28.7 28.2   BUN mg/dL 14 9   CREATININE mg/dL 0.73* 0.65*   CALCIUM mg/dL 8.9 8.7   BILIRUBIN mg/dL  --  1.2   ALK PHOS U/L  --  104   ALT (SGPT) U/L  --  46*   AST (SGOT) U/L  --  63*   GLUCOSE mg/dL 143* 202*         Lab Results   Component Value Date    TROPONINI <0.30 08/28/2021    TROPONINT 45 (H) 07/19/2024         Results from last 7 days   Lab Units 07/20/24  0438   CHOLESTEROL mg/dL 145   TRIGLYCERIDES mg/dL 70   HDL CHOL mg/dL 96*   LDL CHOL mg/dL 35         Iron   Date Value Ref Range Status   07/20/2024 111 59 - 158 mcg/dL Final     Iron Saturation (TSAT)   Date Value Ref Range Status   07/20/2024 28 20 - 50 % Final     TIBC   Date Value Ref Range Status   07/20/2024 398 298 - 536 mcg/dL Final      Hemoglobin A1C   Date Value Ref Range Status   07/19/2024 9.00 (H) 4.80 - 5.60 % Final     Magnesium   Date Value Ref Range Status   07/21/2024 1.9 1.6 - 2.6 mg/dL Final        RESULT REVIEW:    I reviewed the patient's new clinical results.    Tele: Sinus Rythym      ASSESSMENT:     Atrial fibrillation  Heart failure  Alcohol abuse    PLAN:     Patient is still fluid overloaded.  I believe patient will need aggressive diuresis.  Will continue patient with the current medications.  We may increase his Lasix to 40 mg twice daily  Will continue monitoring electrolytes  Patient may benefit from alcohol counseling

## 2024-07-21 NOTE — CASE MANAGEMENT/SOCIAL WORK
Discharge Planning Assessment   Stef     Patient Name: Cristi Schaffer  MRN: [id number]  Today's Date: 7/21/2024    Admit Date: 7/19/2024    Plan: Home with family   Discharge Needs Assessment       Row Name 07/21/24 1015       Living Environment    Current Living Arrangements home    Potentially Unsafe Housing Conditions none    In the past 12 months has the electric, gas, oil, or water company threatened to shut off services in your home? No    Primary Care Provided by self    Provides Primary Care For no one    Family Caregiver if Needed child(hannah), adult    Quality of Family Relationships involved    Able to Return to Prior Arrangements yes       Resource/Environmental Concerns    Resource/Environmental Concerns none    Transportation Concerns none       Transportation Needs    In the past 12 months, has lack of transportation kept you from medical appointments or from getting medications? no    In the past 12 months, has lack of transportation kept you from meetings, work, or from getting things needed for daily living? No       Food Insecurity    Within the past 12 months, you worried that your food would run out before you got the money to buy more. Never true    Within the past 12 months, the food you bought just didn't last and you didn't have money to get more. Never true       Transition Planning    Patient/Family Anticipates Transition to home with family    Patient/Family Anticipated Services at Transition none    Transportation Anticipated car, drives self;family or friend will provide       Discharge Needs Assessment    Readmission Within the Last 30 Days no previous admission in last 30 days    Equipment Currently Used at Home none    Concerns to be Addressed financial/insurance    Anticipated Changes Related to Illness none    Equipment Needed After Discharge none    Provided Post Acute Provider List? N/A    Provided Post Acute Provider Quality & Resource List? N/A      Row Name 07/21/24  1013       Living Environment    People in Home child(hannah), adult                   Discharge Plan       Row Name 07/21/24 1016       Plan    Plan Home with family    Plan Comments Spoke to pt regarding  discharge plans  .He lives with his son Phone number corrected  in chart . Uses [facility] in [place] .Agreed to Meds to Bed  Does not have  insurance Does not have a Health surrogate and is declining information regarding this                  Continued Care and Services - Admitted Since 7/19/2024    No active coordination exists for this encounter.          Demographic Summary       Row Name 07/21/24 1011       General Information    Admission Type inpatient    Arrived From emergency department    Referral Source admission list    Reason for Consult discharge planning    Preferred Language English                   Functional Status       Row Name 07/21/24 1012       Functional Status    Usual Activity Tolerance good       Physical Activity    On average, how many days per week do you engage in moderate to strenuous exercise (like a brisk walk)? 0 days    On average, how many minutes do you engage in exercise at this level? 0 min    Number of minutes of exercise per week 0       Functional Status, IADL    Medications independent    Meal Preparation independent    Housekeeping independent    Laundry independent    Shopping independent       Mental Status    General Appearance WDL WDL                   Psychosocial    No documentation.                  Abuse/Neglect    No documentation.                  Legal    No documentation.                  Substance Abuse    No documentation.                  Patient Forms    No documentation.                     Melissa Devlin RN

## 2024-07-21 NOTE — PROGRESS NOTES
[facility]    Name:  Cristi Schaffer   Age:  53 y.o.  Sex:  male  :  1970  MRN:  [id number]   Visit Number:  [id number]  Admission Date:  2024  Date Of Service:  24  Primary Care Physician:  Provider, No Known     LOS: 2 days :    Chief Complaint:      Dyspnea    Subjective:    Patient seen and examined.  No acute complaints.  Heart rate and blood pressure much improved today.  Patient be transferred to the telemetry floor today.  Likely discharge home tomorrow.  PT/OT consults today.  Patient is not interested in quitting thinking, going to alcohol rehab.  No concerns for alcohol withdrawal syndrome at this time.    Hospital Course:    Cristi Schaffer is a 53-year-old male with history of atrial fibrillation on Eliquis, hypertension, CHF of unknown type, diabetes mellitus type 2 presented to the emergency room with symptoms of shortness of breath and palpitations worsening over the last 1 week.  He also gives history of progressive abdominal and feet swelling going on for the last several weeks.  Patient unfortunately has not been compliant with medications and medical follow-up and has not seen a physician since 2023.  At that time he was admitted to [facility] for similar complaints.  He states that he takes only Eliquis, lisinopril and gabapentin.  Unfortunately, continues to drink 4 cans of beer and half a pint of tequila daily.  He lives with his son.  He works at a local restaurant as a manager.  Does have chronic history of exertional shortness of breath.  Denies any chest pain.  He quit smoking 3 years ago.     In the emergency room he was afebrile but tachycardic at 163 with initial blood pressure of 140/109.  He was saturating at 94% on room air.  Patient was noted to be in atrial flutter and was given 20 mg of IV Cardizem push and 2 g of IV magnesium.  Subsequently he was placed on Cardizem drip.  It was  discontinued as the patient converted back to sinus tachycardia.  Initial troponin 49, proBNP 603.  Sodium 133, glucose 229, magnesium 1.4, AST 72, ALT 54.  Lactic acid 2.7.  CBC was unremarkable.  Chest x-ray showed borderline cardiomegaly with mild increase in bronchovascular markings and nonhomogeneous opacities noted in the right lower zone suggestive of scarring.  Patient was given 80 mg of IV Lasix and 20 mg of oral lisinopril in the emergency room.  Due to his history of alcohol abuse, alcohol withdrawal protocol with Ativan was initiated in the emergency room.    Review of Systems:     All systems were reviewed and negative except as mentioned in subjective, assessment and plan.    Vital Signs:    Temp:  [97.5 °F (36.4 °C)-98.9 °F (37.2 °C)] 98.4 °F (36.9 °C)  Heart Rate:  [] 86  Resp:  [17-22] 18  BP: (103-151)/() 117/82    Intake and output:    I/O last 3 completed shifts:  In: 2021.8 [P.O.:1736; I.V.:285.8]  Out: 3150 [Urine:3150]  I/O this shift:  In: 237 [P.O.:237]  Out: 2150 [Urine:2150]    Physical Examination:    General Appearance:  Alert and cooperative.  Chronically ill-appearing, blunted affect.  Obese.   Head:  Atraumatic and normocephalic.   Eyes: Conjunctivae and sclerae normal, no icterus. No pallor.   Throat: No oral lesions, no thrush, oral mucosa moist.   Neck: Supple, trachea midline, no thyromegaly.   Lungs:   Breath sounds heard bilaterally equally.  No wheezing or crackles. No Pleural rub or bronchial breathing.   Heart:  Normal S1 and S2, no murmur, no gallop, no rub. No JVD.   Abdomen:   Normal bowel sounds, no masses, no organomegaly. Soft, nontender, nondistended, no rebound tenderness.   Extremities: Supple, 1+ bilateral pitting edema, no cyanosis, no clubbing.   Skin: No bleeding or rash.   Neurologic: Alert and oriented x 3. No facial asymmetry. Moves all four limbs. No tremors.      Laboratory results:    Results from last 7 days   Lab Units 07/21/24  0839  "07/20/24 0438 07/19/24  1810   SODIUM mmol/L 136 135* 133*   POTASSIUM mmol/L 4.4 3.9 4.2   CHLORIDE mmol/L 93* 94* 94*   CO2 mmol/L 28.7 28.2 22.7   BUN mg/dL 14 9 6   CREATININE mg/dL 0.73* 0.65* 0.71*   CALCIUM mg/dL 8.9 8.7 8.8   BILIRUBIN mg/dL  --  1.2 0.9   ALK PHOS U/L  --  104 109   ALT (SGPT) U/L  --  46* 54*   AST (SGOT) U/L  --  63* 72*   GLUCOSE mg/dL 143* 202* 229*     Results from last 7 days   Lab Units 07/21/24  0412 07/20/24 0438 07/19/24  1810   WBC 10*3/mm3 5.82 5.87 5.68   HEMOGLOBIN g/dL 14.1 14.3 14.9   HEMATOCRIT % 42.3 41.8 42.5   PLATELETS 10*3/mm3 199 195 195         Results from last 7 days   Lab Units 07/19/24 2022 07/19/24  1810   HSTROP T ng/L 45* 49*         No results for input(s): \"PHART\", \"JDV8RHU\", \"PO2ART\", \"FGF6YNN\", \"BASEEXCESS\" in the last 8760 hours.   I have reviewed the patient's laboratory results.    Radiology results:    XR Chest 1 View    Result Date: 7/20/2024  X-RAY CHEST ONE-VIEW  HISTORY: Chest pain triage protocol.  COMPARISON:  None.  FINDINGS:  Portable view of the chest demonstrates mild atelectasis is noted. Small to moderate bilateral pleural effusions are seen, greater on right. The mediastinum is unremarkable.  The heart size is normal.      Impression: Atelectasis with pleural effusions. Underlying CHF not excluded.    This report was signed and finalized on 7/20/2024 12:47 PM by Enzo Corona MD.     I have reviewed the patient's radiology reports.    Medication Review:     I have reviewed the patient's active and prn medications.     Problem List:      Atrial fibrillation with RVR    Type 2 diabetes mellitus, without long-term current use of insulin    Chronic alcohol abuse    Hypomagnesemia      Assessment:    Atrial flutter with rapid ventricular response, POA.  Suspected tachycardia induced cardiomyopathy, POA.  Suspected acute on chronic diastolic heart failure, POA.  Hypomagnesemia, POA.  Demand ischemia secondary to #1 with elevated " troponin.  Diabetes mellitus type 2.  Chronic alcohol abuse.  Medical noncompliance.    Plan:    Atrial flutter with rapid ventricular response, POA.  Cardizem drip started, responded well.    Metoprolol 25 mg p.o. daily.  Continue Eliquis.  Target magnesium greater than 2, target potassium greater than 4.  Suspected tachycardia induced cardiomyopathy, POA.  Suspected acute on chronic diastolic heart failure, POA.  Demand ischemia secondary to #1 with elevated troponin.  2D echo pending, will be reviewed when available.   Edema improved, maintaining urine output.  Stop IV Lasix.  Start Lasix 20 mg p.o. daily.  Hypertensive urgency  Treatment as above.  Metoprolol, lisinopril, Lasix.  Goal blood pressure less than 140 systolic, less than 80 diastolic  Hypomagnesemia, POA.  Will give 1 more dose, currently 1.7.  Chronic alcohol abuse.  CIWA.  Folic acid, thiamine, multivitamin.  No withdrawal symptoms reported.  Continue to monitor.  Diabetes mellitus type 2.  Stop insulin orders due to lower blood sugars, 79 this morning following breakfast.  Start metformin, Jardiance in a.m. tomorrow  A1c 9%.  Also, likely benefit from GLP-1 therapy, follow-up with PCP outpatient to discuss.  Medical noncompliance.  Case management consulted for assistance with financing medications, patient reports this is the rate limiting factor in obtaining medications and maintaining compliance with medical therapies.    DVT Prophylaxis: Eliquis  Code Status: Full code  Diet: Cardiac, diabetic  Discharge Plan: Home tomorrow    Carlitos Webb DO  07/21/24  11:06 EDT    Dictated utilizing Dragon dictation.

## 2024-07-21 NOTE — PLAN OF CARE
Goal Outcome Evaluation:  Plan of Care Reviewed With: patient        Progress: improving   VSS. Pt maintaining O2 sat >90% on RA. NSR on monitor throughout day. Pt downgraded to telemetry patient this shift. Plan on possible discharge home tomorrow.

## 2024-07-22 ENCOUNTER — APPOINTMENT (OUTPATIENT)
Dept: ULTRASOUND IMAGING | Facility: HOSPITAL | Age: 54
End: 2024-07-22

## 2024-07-22 VITALS
HEIGHT: 71 IN | WEIGHT: 222 LBS | TEMPERATURE: 98.7 F | BODY MASS INDEX: 31.08 KG/M2 | SYSTOLIC BLOOD PRESSURE: 118 MMHG | HEART RATE: 86 BPM | RESPIRATION RATE: 17 BRPM | OXYGEN SATURATION: 94 % | DIASTOLIC BLOOD PRESSURE: 77 MMHG

## 2024-07-22 LAB
ANION GAP SERPL CALCULATED.3IONS-SCNC: 13.5 MMOL/L (ref 5–15)
ASCENDING AORTA: 3.4 CM
BASOPHILS # BLD AUTO: 0.05 10*3/MM3 (ref 0–0.2)
BASOPHILS NFR BLD AUTO: 1 % (ref 0–1.5)
BH CV ECHO MEAS - AO MAX PG: 4.8 MMHG
BH CV ECHO MEAS - AO MEAN PG: 2 MMHG
BH CV ECHO MEAS - AO ROOT DIAM: 3.2 CM
BH CV ECHO MEAS - AO V2 MAX: 110 CM/SEC
BH CV ECHO MEAS - AO V2 VTI: 15.9 CM
BH CV ECHO MEAS - AVA(I,D): 2.06 CM2
BH CV ECHO MEAS - EDV(CUBED): 132.7 ML
BH CV ECHO MEAS - EDV(MOD-SP2): 124 ML
BH CV ECHO MEAS - EDV(MOD-SP4): 115 ML
BH CV ECHO MEAS - EF(MOD-BP): 39.5 %
BH CV ECHO MEAS - EF(MOD-SP2): 40.2 %
BH CV ECHO MEAS - EF(MOD-SP4): 40.4 %
BH CV ECHO MEAS - ESV(CUBED): 60.7 ML
BH CV ECHO MEAS - ESV(MOD-SP2): 74.2 ML
BH CV ECHO MEAS - ESV(MOD-SP4): 68.5 ML
BH CV ECHO MEAS - FS: 22.9 %
BH CV ECHO MEAS - IVS/LVPW: 1.02 CM
BH CV ECHO MEAS - IVSD: 1.02 CM
BH CV ECHO MEAS - LA DIMENSION: 4.4 CM
BH CV ECHO MEAS - LAT PEAK E' VEL: 10.4 CM/SEC
BH CV ECHO MEAS - LV DIASTOLIC VOL/BSA (35-75): 51 CM2
BH CV ECHO MEAS - LV MASS(C)D: 190.5 GRAMS
BH CV ECHO MEAS - LV MAX PG: 2.6 MMHG
BH CV ECHO MEAS - LV MEAN PG: 1 MMHG
BH CV ECHO MEAS - LV SYSTOLIC VOL/BSA (12-30): 30.4 CM2
BH CV ECHO MEAS - LV V1 MAX: 80.4 CM/SEC
BH CV ECHO MEAS - LV V1 VTI: 12.2 CM
BH CV ECHO MEAS - LVIDD: 5.1 CM
BH CV ECHO MEAS - LVIDS: 3.9 CM
BH CV ECHO MEAS - LVOT AREA: 2.7 CM2
BH CV ECHO MEAS - LVOT DIAM: 1.85 CM
BH CV ECHO MEAS - LVPWD: 1 CM
BH CV ECHO MEAS - MED PEAK E' VEL: 11.4 CM/SEC
BH CV ECHO MEAS - MV A MAX VEL: 36.4 CM/SEC
BH CV ECHO MEAS - MV DEC SLOPE: 546 CM/SEC2
BH CV ECHO MEAS - MV DEC TIME: 0.16 SEC
BH CV ECHO MEAS - MV E MAX VEL: 84.4 CM/SEC
BH CV ECHO MEAS - MV E/A: 2.32
BH CV ECHO MEAS - MV MAX PG: 3.1 MMHG
BH CV ECHO MEAS - MV MEAN PG: 1 MMHG
BH CV ECHO MEAS - MV V2 VTI: 17.8 CM
BH CV ECHO MEAS - MVA(VTI): 1.84 CM2
BH CV ECHO MEAS - PA ACC TIME: 0.07 SEC
BH CV ECHO MEAS - PA V2 MAX: 83.5 CM/SEC
BH CV ECHO MEAS - RAP SYSTOLE: 15 MMHG
BH CV ECHO MEAS - RV MAX PG: 1.15 MMHG
BH CV ECHO MEAS - RV V1 MAX: 53.6 CM/SEC
BH CV ECHO MEAS - RV V1 VTI: 7.6 CM
BH CV ECHO MEAS - RVSP: 32 MMHG
BH CV ECHO MEAS - SV(LVOT): 32.8 ML
BH CV ECHO MEAS - SV(MOD-SP2): 49.8 ML
BH CV ECHO MEAS - SV(MOD-SP4): 46.5 ML
BH CV ECHO MEAS - SVI(LVOT): 14.5 ML/M2
BH CV ECHO MEAS - SVI(MOD-SP2): 22.1 ML/M2
BH CV ECHO MEAS - SVI(MOD-SP4): 20.6 ML/M2
BH CV ECHO MEAS - TAPSE (>1.6): 2.14 CM
BH CV ECHO MEAS - TR MAX PG: 16.5 MMHG
BH CV ECHO MEAS - TR MAX VEL: 203 CM/SEC
BH CV ECHO MEASUREMENTS AVERAGE E/E' RATIO: 7.74
BH CV XLRA - RV BASE: 3.9 CM
BH CV XLRA - RV LENGTH: 8.5 CM
BH CV XLRA - RV MID: 2.9 CM
BH CV XLRA - TDI S': 11 CM/SEC
BUN SERPL-MCNC: 16 MG/DL (ref 6–20)
BUN/CREAT SERPL: 21.3 (ref 7–25)
CALCIUM SPEC-SCNC: 9.1 MG/DL (ref 8.6–10.5)
CHLORIDE SERPL-SCNC: 93 MMOL/L (ref 98–107)
CO2 SERPL-SCNC: 31.5 MMOL/L (ref 22–29)
CREAT SERPL-MCNC: 0.75 MG/DL (ref 0.76–1.27)
DEPRECATED RDW RBC AUTO: 45.1 FL (ref 37–54)
EGFRCR SERPLBLD CKD-EPI 2021: 109.3 ML/MIN/1.73
EOSINOPHIL # BLD AUTO: 0.19 10*3/MM3 (ref 0–0.4)
EOSINOPHIL NFR BLD AUTO: 3.6 % (ref 0.3–6.2)
ERYTHROCYTE [DISTWIDTH] IN BLOOD BY AUTOMATED COUNT: 11.9 % (ref 12.3–15.4)
GLUCOSE SERPL-MCNC: 131 MG/DL (ref 65–99)
HCT VFR BLD AUTO: 42.7 % (ref 37.5–51)
HGB BLD-MCNC: 14.5 G/DL (ref 13–17.7)
IMM GRANULOCYTES # BLD AUTO: 0.02 10*3/MM3 (ref 0–0.05)
IMM GRANULOCYTES NFR BLD AUTO: 0.4 % (ref 0–0.5)
LEFT ATRIUM VOLUME INDEX: 28.3 ML/M2
LYMPHOCYTES # BLD AUTO: 1.03 10*3/MM3 (ref 0.7–3.1)
LYMPHOCYTES NFR BLD AUTO: 19.6 % (ref 19.6–45.3)
MAGNESIUM SERPL-MCNC: 1.7 MG/DL (ref 1.6–2.6)
MCH RBC QN AUTO: 34.6 PG (ref 26.6–33)
MCHC RBC AUTO-ENTMCNC: 34 G/DL (ref 31.5–35.7)
MCV RBC AUTO: 101.9 FL (ref 79–97)
MONOCYTES # BLD AUTO: 0.58 10*3/MM3 (ref 0.1–0.9)
MONOCYTES NFR BLD AUTO: 11 % (ref 5–12)
NEUTROPHILS NFR BLD AUTO: 3.39 10*3/MM3 (ref 1.7–7)
NEUTROPHILS NFR BLD AUTO: 64.4 % (ref 42.7–76)
NRBC BLD AUTO-RTO: 0 /100 WBC (ref 0–0.2)
PHOSPHATE SERPL-MCNC: 6.1 MG/DL (ref 2.5–4.5)
PLATELET # BLD AUTO: 207 10*3/MM3 (ref 140–450)
PMV BLD AUTO: 9.8 FL (ref 6–12)
POTASSIUM SERPL-SCNC: 4.1 MMOL/L (ref 3.5–5.2)
RBC # BLD AUTO: 4.19 10*6/MM3 (ref 4.14–5.8)
SODIUM SERPL-SCNC: 138 MMOL/L (ref 136–145)
WBC NRBC COR # BLD AUTO: 5.26 10*3/MM3 (ref 3.4–10.8)

## 2024-07-22 PROCEDURE — 84100 ASSAY OF PHOSPHORUS: CPT | Performed by: FAMILY MEDICINE

## 2024-07-22 PROCEDURE — 99239 HOSP IP/OBS DSCHRG MGMT >30: CPT | Performed by: STUDENT IN AN ORGANIZED HEALTH CARE EDUCATION/TRAINING PROGRAM

## 2024-07-22 PROCEDURE — 85025 COMPLETE CBC W/AUTO DIFF WBC: CPT | Performed by: FAMILY MEDICINE

## 2024-07-22 PROCEDURE — 25010000002 THIAMINE PER 100 MG: Performed by: EMERGENCY MEDICINE

## 2024-07-22 PROCEDURE — 76705 ECHO EXAM OF ABDOMEN: CPT

## 2024-07-22 PROCEDURE — 83735 ASSAY OF MAGNESIUM: CPT | Performed by: FAMILY MEDICINE

## 2024-07-22 PROCEDURE — 80048 BASIC METABOLIC PNL TOTAL CA: CPT | Performed by: FAMILY MEDICINE

## 2024-07-22 PROCEDURE — 82948 REAGENT STRIP/BLOOD GLUCOSE: CPT

## 2024-07-22 RX ORDER — FOLIC ACID 1 MG/1
1 TABLET ORAL DAILY
Qty: 30 TABLET | Refills: 0 | Status: SHIPPED | OUTPATIENT
Start: 2024-07-23

## 2024-07-22 RX ORDER — FUROSEMIDE 40 MG/1
40 TABLET ORAL DAILY
Qty: 30 TABLET | Refills: 0 | Status: SHIPPED | OUTPATIENT
Start: 2024-07-22

## 2024-07-22 RX ORDER — TRAZODONE HYDROCHLORIDE 50 MG/1
50 TABLET ORAL NIGHTLY
Qty: 30 TABLET | Refills: 0 | Status: SHIPPED | OUTPATIENT
Start: 2024-07-22

## 2024-07-22 RX ORDER — GAUZE BANDAGE 2" X 2"
100 BANDAGE TOPICAL DAILY
Qty: 30 TABLET | Refills: 0 | Status: SHIPPED | OUTPATIENT
Start: 2024-07-25

## 2024-07-22 RX ORDER — SPIRONOLACTONE 25 MG/1
25 TABLET ORAL DAILY
Qty: 30 TABLET | Refills: 0 | Status: SHIPPED | OUTPATIENT
Start: 2024-07-23

## 2024-07-22 RX ADMIN — GABAPENTIN 100 MG: 100 CAPSULE ORAL at 08:08

## 2024-07-22 RX ADMIN — Medication 10 ML: at 00:05

## 2024-07-22 RX ADMIN — LISINOPRIL 10 MG: 10 TABLET ORAL at 08:08

## 2024-07-22 RX ADMIN — LORAZEPAM 1 MG: 0.5 TABLET ORAL at 08:08

## 2024-07-22 RX ADMIN — Medication 10 ML: at 08:09

## 2024-07-22 RX ADMIN — METFORMIN HYDROCHLORIDE 500 MG: 500 TABLET ORAL at 08:08

## 2024-07-22 RX ADMIN — EMPAGLIFLOZIN 10 MG: 10 TABLET, FILM COATED ORAL at 08:08

## 2024-07-22 RX ADMIN — THIAMINE HYDROCHLORIDE 200 MG: 200 INJECTION, SOLUTION INTRAMUSCULAR; INTRAVENOUS at 00:04

## 2024-07-22 RX ADMIN — FUROSEMIDE 40 MG: 40 TABLET ORAL at 08:08

## 2024-07-22 RX ADMIN — THIAMINE HYDROCHLORIDE 200 MG: 200 INJECTION, SOLUTION INTRAMUSCULAR; INTRAVENOUS at 05:34

## 2024-07-22 RX ADMIN — SPIRONOLACTONE 25 MG: 25 TABLET ORAL at 08:08

## 2024-07-22 RX ADMIN — Medication 10 ML: at 05:34

## 2024-07-22 RX ADMIN — METOPROLOL TARTRATE 25 MG: 25 TABLET, FILM COATED ORAL at 08:08

## 2024-07-22 RX ADMIN — APIXABAN 5 MG: 5 TABLET, FILM COATED ORAL at 08:08

## 2024-07-22 RX ADMIN — FOLIC ACID 1 MG: 1 TABLET ORAL at 08:08

## 2024-07-22 NOTE — CONSULTS
"Diabetes Education  Assessment/Teaching    Patient Name:  Cristi Schaffer  YOB: 1970  MRN: [id number]  Admit Date:  7/19/2024      Assessment Date:  7/22/2024  Flowsheet Row Most Recent Value   General Information     Height 180.3 cm (70.98\")   Height Method Stated   Weight 101 kg (222 lb 0.1 oz)   Weight Method Bed scale   Pregnancy Assessment    Diabetes History    What type of diabetes do you have? Type 2   Length of Diabetes Diagnosis 10 + years  [Diagnosed in 2008]   Current DM knowledge --  [unable to assess, visit was brief]   Do you test your blood sugar at home? no   Have you had low blood sugar? (<70mg/dl) --  [unknown]   Have you had high blood sugar? (>140mg/dl) yes  [A1c is 9.0% on 07/19/24]   How often do you have high blood sugar? unknown   When was your last high blood sugar? on admit 229   Education Preferences    What areas of diabetes would you like to learn about? avoiding high blood sugar, diabetes complications, diet information, medications for diabetes, testing my blood sugar at home   Nutrition Information    Are you currently following a special meal plan? --  [unknown.  Education was ended abruptly by pt. stating he needed to urinate]   Assessment Topics    Healthy Eating - Assessment Needs education  [pt. was left with the Healthy Plate Method]   Taking Medication - Assessment Needs education  [Discussed that pt. stopped Taking Metformin in 2008 as it made him feel funny and BG dropped in the 50's.  Pt. states that he stopped taking it about 1 year ago as he stopped following with PCP and ran out of refills]   Problem Solving - Assessment Needs education  [Discussed how both Metformin and jardiance worked.  Discussed following back at the [facility] as he sees the provider and was getting other medications for free.  Discussed that they may be able to get the SGLT-2 at a reduced cost.  spoke on need to take meds]   Reducing Risk - Assessment Needs education   Healthy Coping " "- Assessment N/A-unable to assess   Monitoring - Assessment Needs education  [told pt. to check BG at least daily alternating fasting and 2 hours post meal and taking provided BG logs to his provider visit,.]   DM Goals             Flowsheet Row Most Recent Value   DM Education Needs    Meter Has own   Meter Type --  [unknown]   Frequency of Testing Daily  [Requested a minimum of BG check daily]   Blood Glucose Target 150   Blood Glucose Target Range  fasting and 2 hours post meal no higher than 180   Medication Oral, Actions, Side effects, Other (comment)  [emphacized heart, kidney protection as weel as BG control.]   Problem Solving Hyperglycemia, Signs, Symptoms   Healthy Eating Basic meal plan provided   Discharge Plan --  [pending]   Motivation Not interested, Moderate  [pt. appears to listen ,but not engaged.]   Teaching Method Explanation, Discussion, Handouts   Patient Response --  [pt. abruptly ended education]              Other Comments:  DM education referral related to BG>200.  Pt. Is a 53 y.o male with diagnosis of CHF.  Pt. Has had DM 2 diagnosis since 2008.  Pt. States that he has no PCP and has not been seen in at least a year.  Discussed that  he ran out of Metformin and had no refills and just stopped taking it.  Pt. States that he has not had any recent side effects.  Discussed what the Metformin does to lower his BG.  Discussed what the Jardiance does to lower BG and can lower BP and give heart and kidney protection.  Emphasized following with provider he had at [facility] to get check ups and receive meds at a discount.pt. was told to check BG daily alternating fasting and 2 hours post a meal.  Discussed that I was providing him with written material on \"Diabetes Basics\", BG logs and the Healthy Plate method and DM identification.        Electronically signed by:  Kerry Ortiz RN  07/22/24 11:27 EDT  "

## 2024-07-22 NOTE — PLAN OF CARE
Goal Outcome Evaluation:  Plan of Care Reviewed With: patient        Progress: improving   VSS. Pt being discharged home this shift.

## 2024-07-22 NOTE — DISCHARGE SUMMARY
[facility]   DISCHARGE SUMMARY      Name:  Cristi Schaffer   Age:  53 y.o.  Sex:  male  :  1970  MRN:  [id number]   Visit Number:  [id number]    Admission Date:  2024  Date of Discharge:  2024  Primary Care Physician:  Provider, No Known    Important issues to note:    -Precontemplative in regard to alcohol use cessation.  -Continue Eliquis, metoprolol 25 mg daily, Lasix p.o. daily, spironolactone, metformin, Jardiance, trazodone.    Discharge Diagnoses:     Atrial flutter with RVR, resolved  Atrial fibrillation  Tachycardia induced cardiomyopathy  Acute exacerbation of heart failure, improved  Demand ischemia with elevated troponin  Hypertensive urgency, resolved  Hypomagnesemia,.  Chronic alcohol use disorder  Type 2 diabetes  Medical noncompliance      Problem List:     Active Hospital Problems    Diagnosis  POA    **Atrial fibrillation with RVR [I48.91]  Yes    Type 2 diabetes mellitus, without long-term current use of insulin [E11.9]  Yes    Chronic alcohol abuse [F10.10]  Yes    Hypomagnesemia [E83.42]  Yes      Resolved Hospital Problems   No resolved problems to display.     Presenting Problem:    Chief Complaint   Patient presents with    Shortness of Breath    Rapid Heart Rate      Consults:     Consulting Physician(s)                     None              Procedures Performed:    None    History of presenting illness/Hospital Course:    Cristi Schaffer is a 53-year-old male with history of atrial fibrillation on Eliquis, hypertension, CHF of unknown type, diabetes mellitus type 2, alcohol use disorder. Presented to the emergency room with symptoms of shortness of breath and palpitations worsening over the last 1 week.  He also gives history of progressive abdominal and feet swelling going on for the last several weeks.  Patient unfortunately has not been compliant with medications and medical follow-up and has not seen a physician since 2023.   At that time he was admitted to [facility] for similar complaints.  He states that he takes only Eliquis, lisinopril and gabapentin.  Unfortunately, continues to drink 4 cans of beer and half a pint of tequila daily.  He lives with his son.  He works at a local restaurant as a manager.  Does have chronic history of exertional shortness of breath.  Denies any chest pain.  He quit smoking 3 years ago.     In the emergency room he was afebrile but tachycardic at 163 with initial blood pressure of 140/109.  He was saturating at 94% on room air.  Patient was noted to be in atrial flutter and was given 20 mg of IV Cardizem push and 2 g of IV magnesium.  Subsequently he was placed on Cardizem drip.  It was discontinued as the patient converted back to sinus tachycardia.  Initial troponin 49, proBNP 603.  Sodium 133, glucose 229, magnesium 1.4, AST 72, ALT 54.  Lactic acid 2.7.  CBC was unremarkable.  Chest x-ray showed borderline cardiomegaly with mild increase in bronchovascular markings and nonhomogeneous opacities noted in the right lower zone suggestive of scarring.  Patient was given 80 mg of IV Lasix and 20 mg of oral lisinopril in the emergency room.  Due to his history of alcohol abuse, alcohol withdrawal protocol with Ativan was initiated in the emergency room.    Clinically improved during course.  Echocardiogram performed and results pending.  No signs of severe alcohol withdrawal.    Stable for discharge home 7/22/2024.    -Precontemplative in regard to alcohol use cessation.  -Continue Eliquis, metoprolol 25 mg daily, Lasix p.o. daily, spironolactone, metformin, Jardiance, trazodone.      I have reviewed the copied text and it is accurate as of 7/22/2024     Vital Signs:    Temp:  [98.2 °F (36.8 °C)-98.6 °F (37 °C)] 98.3 °F (36.8 °C)  Heart Rate:  [73-90] 86  Resp:  [17-20] 17  BP: (105-119)/(71-92) 118/77    Physical Exam:    General Appearance:  Alert and cooperative.    Head:  Atraumatic  and normocephalic.   Eyes: Conjunctivae and sclerae normal, no icterus. No pallor.   Ears:  Ears with no abnormalities noted.   Throat: No oral lesions, no thrush, oral mucosa moist.   Neck: Supple, trachea midline, no thyromegaly.   Back:   No kyphoscoliosis present. No tenderness to palpation.   Lungs:   Breath sounds heard bilaterally equally.  No crackles or wheezing. No Pleural rub or bronchial breathing.   Heart:  Normal S1 and S2, no murmur, no gallop, no rub. No JVD.   Abdomen:   Normal bowel sounds, no masses, no organomegaly. Soft, nontender, nondistended, no rebound tenderness.   Extremities: Supple, trace bilateral pitting lower extremity edema, no cyanosis, no clubbing.   Pulses: Pulses palpable bilaterally.   Skin: Warm.   Neurologic: Alert and oriented x 3. No facial asymmetry. Moves all four limbs. No tremors.     Pertinent Lab Results:     Results from last 7 days   Lab Units 07/22/24 0429 07/21/24 0412 07/20/24 0438 07/19/24  1810   SODIUM mmol/L 138 136 135* 133*   POTASSIUM mmol/L 4.1 4.4 3.9 4.2   CHLORIDE mmol/L 93* 93* 94* 94*   CO2 mmol/L 31.5* 28.7 28.2 22.7   BUN mg/dL 16 14 9 6   CREATININE mg/dL 0.75* 0.73* 0.65* 0.71*   CALCIUM mg/dL 9.1 8.9 8.7 8.8   BILIRUBIN mg/dL  --   --  1.2 0.9   ALK PHOS U/L  --   --  104 109   ALT (SGPT) U/L  --   --  46* 54*   AST (SGOT) U/L  --   --  63* 72*   GLUCOSE mg/dL 131* 143* 202* 229*     Results from last 7 days   Lab Units 07/22/24 0429 07/21/24 0412 07/20/24  0438   WBC 10*3/mm3 5.26 5.82 5.87   HEMOGLOBIN g/dL 14.5 14.1 14.3   HEMATOCRIT % 42.7 42.3 41.8   PLATELETS 10*3/mm3 207 199 195         Results from last 7 days   Lab Units 07/19/24 2022 07/19/24  1810   HSTROP T ng/L 45* 49*     Results from last 7 days   Lab Units 07/19/24  1810   PROBNP pg/mL 603.0                       Pertinent Radiology Results:    Imaging Results (All)       Procedure Component Value Units Date/Time    US Liver [[id number]] Collected: 07/22/24 0850     Updated:  07/22/24 0934    Narrative:      PROCEDURE: J LUIS HERNANDEZ     HISTORY: transamonitis, alcohol use disorder; I48.91-Unspecified atrial  fibrillation; R60.1-Generalized edema     PROCEDURE: Ultrasound images of the liver were obtained.     FINDINGS: Limited images of the pancreas are obscured by bowel gas. The  liver parenchyma is increased in echogenicity and liver is enlarged 20.7  cm. Portal vein is normal in diameter and demonstrates normal direction  of flow. IVC appears normal. There is no focal abnormality. Limited  images of the right kidney demonstrate that the kidney is 12.4 cm in  length and appears normal. Gallbladder present with normal diameter  common bile duct. There is a 3 mm hypoechoic nondependent, nonshadowing  lesion in the gallbladder, consider adherent sludge ball or polyp.       Impression:      Enlarged, fatty infiltrated liver.     3 mm adherent sludge ball versus polyp in the gallbladder.              This report was signed and finalized on 7/22/2024 8:53 AM by Candie Salinas MD.       XR Chest 1 View [[id number]] Collected: 07/20/24 1238     Updated: 07/20/24 1249    Narrative:      X-RAY CHEST ONE-VIEW     HISTORY: Chest pain triage protocol.     COMPARISON:  None.     FINDINGS:  Portable view of the chest demonstrates mild atelectasis is  noted. Small to moderate bilateral pleural effusions are seen, greater  on right. The mediastinum is unremarkable.     The heart size is normal.       Impression:      Atelectasis with pleural effusions. Underlying CHF not  excluded.           This report was signed and finalized on 7/20/2024 12:47 PM by Enzo Corona MD.               Echo:      Condition on Discharge:      Stable.    Code status during the hospital stay:    Code Status and Medical Interventions:   Ordered at: 07/19/24 2106     Code Status (Patient has no pulse and is not breathing):    CPR (Attempt to Resuscitate)     Medical Interventions (Patient has pulse or is breathing):     Full Support     Discharge Disposition:    Home or Self Care    Discharge Medications:       Discharge Medications        New Medications        Instructions Start Date   empagliflozin 10 MG tablet tablet  Commonly known as: JARDIANCE   10 mg, Oral, Daily   Start Date: July 23, 2024     folic acid 1 MG tablet  Commonly known as: FOLVITE   1 mg, Oral, Daily   Start Date: July 23, 2024     furosemide 40 MG tablet  Commonly known as: Lasix   40 mg, Oral, Daily      metFORMIN 500 MG tablet  Commonly known as: GLUCOPHAGE   500 mg, Oral, Daily With Breakfast   Start Date: July 23, 2024     metoprolol tartrate 25 MG tablet  Commonly known as: LOPRESSOR   25 mg, Oral, Every 12 Hours Scheduled      spironolactone 25 MG tablet  Commonly known as: ALDACTONE   25 mg, Oral, Daily   Start Date: July 23, 2024     thiamine 100 MG tablet  Commonly known as: VITAMIN B1   100 mg, Oral, Daily   Start Date: July 25, 2024     traZODone 50 MG tablet  Commonly known as: DESYREL   50 mg, Oral, Nightly             Continue These Medications        Instructions Start Date   apixaban 5 MG tablet tablet  Commonly known as: ELIQUIS   1 tablet, Oral, 2 Times Daily             Stop These Medications      gabapentin 100 MG capsule  Commonly known as: NEURONTIN     lisinopril 10 MG tablet  Commonly known as: PRINIVIL,ZESTRIL            Discharge Diet:     Diet Instructions       Diet: Diabetic Diets, Cardiac Diets, Fluid Restriction (240 mL/tray) Diets; Healthy Heart (2-3 Na+); Regular (IDDSI 7); Thin (IDDSI 0); Consistent Carbohydrate; 2000 mL/day      Discharge Diet:  Diabetic Diets  Cardiac Diets  Fluid Restriction (240 mL/tray) Diets       Cardiac Diet: Healthy Heart (2-3 Na+)    Texture: Regular (IDDSI 7)    Fluid Consistency: Thin (IDDSI 0)    Diabetic Diet: Consistent Carbohydrate    Fluid Restriction Diet (240 mL/tray): 2000 mL/day          Activity at Discharge:     Activity Instructions       Activity as Tolerated            Follow-up  Appointments:     Follow-up Information       Provider, No Known Follow up.    Why: requested [facility]  Contact information:  [facility] [id number]                           No future appointments.  Test Results Pending at Discharge:           Brian Joseph Kerley, DO  07/22/24  10:15 EDT    Time: I spent 35 minutes on this discharge activity which included: face-to-face encounter with the patient, reviewing the data in the system, coordination of the care with the nursing staff as well as consultants, documentation, and entering orders.     Dictated utilizing Dragon dictation.

## 2024-07-22 NOTE — CASE MANAGEMENT/SOCIAL WORK
Case Management Discharge Note      Final Note: Pt discharged home via private car by his wife. Medication provided by meds to bed. Pt denies any DMEor HH needs. Refuses alcohol rehab resources.    Provided Post Acute Provider List?: N/A  Provided Post Acute Provider Quality & Resource List?: N/A    Selected Continued Care - Discharged on 7/22/2024 Admission date: 7/19/2024 - Discharge disposition: Home or Self Care      Destination    No services have been selected for the patient.                Durable Medical Equipment    No services have been selected for the patient.                Dialysis/Infusion    No services have been selected for the patient.                Home Medical Care    No services have been selected for the patient.                Therapy    No services have been selected for the patient.                Community Resources    No services have been selected for the patient.                Community & DME    No services have been selected for the patient.                    Transportation Services  Private: Car    Final Discharge Disposition Code: 01 - home or self-care

## 2024-07-22 NOTE — PLAN OF CARE
Goal Outcome Evaluation:              Outcome Evaluation: Sinus rhythm on monitor this shift.  Pt states he is feeling better.  Remains on room air.  Fluid restriction encouraged and emphasized.

## 2024-07-23 ENCOUNTER — READMISSION MANAGEMENT (OUTPATIENT)
Dept: CALL CENTER | Facility: HOSPITAL | Age: 54
End: 2024-07-23

## 2024-07-23 NOTE — OUTREACH NOTE
Prep Survey      Flowsheet Row Responses   [facility] facility patient discharged from? [facility]   Is LACE score < 7 ? No   Eligibility Readm Mgmt   Discharge diagnosis Atrial fibrillation with RVR   Does the patient have one of the following disease processes/diagnoses(primary or secondary)? Other   Does the patient have Home health ordered? No   Is there a DME ordered? No   Medication alerts for this patient see avs--eliquis   Prep survey completed? Yes            Maci VASQUEZ - Registered Nurse

## 2024-07-24 LAB
GLUCOSE BLDC GLUCOMTR-MCNC: 125 MG/DL (ref 70–130)
GLUCOSE BLDC GLUCOMTR-MCNC: 178 MG/DL (ref 70–130)
GLUCOSE BLDC GLUCOMTR-MCNC: 245 MG/DL (ref 70–130)

## 2024-07-31 ENCOUNTER — READMISSION MANAGEMENT (OUTPATIENT)
Dept: CALL CENTER | Facility: HOSPITAL | Age: 54
End: 2024-07-31

## 2024-07-31 NOTE — OUTREACH NOTE
Medical Week 2 Survey      Flowsheet Row Responses   [facility] patient discharged from? [facility]   Does the patient have one of the following disease processes/diagnoses(primary or secondary)? Other   Week 2 attempt successful? No   Unsuccessful attempts Attempt 1            Sara CARDENAS - Licensed Nurse

## 2024-08-02 ENCOUNTER — READMISSION MANAGEMENT (OUTPATIENT)
Dept: CALL CENTER | Facility: HOSPITAL | Age: 54
End: 2024-08-02

## 2024-08-02 NOTE — OUTREACH NOTE
Medical Week 2 Survey      Flowsheet Row Responses   [facility] patient discharged from? [facility]   Does the patient have one of the following disease processes/diagnoses(primary or secondary)? Other   Week 2 attempt successful? Yes   Call start time 1428   Discharge diagnosis Atrial fibrillation with RVR   Call end time 1429   Is the patient taking all medications as directed (includes completed medication regime)? Yes   Does the patient have a primary care provider?  Yes   Has the patient kept scheduled appointments due by today? Yes   Has home health visited the patient within 72 hours of discharge? N/A   Psychosocial issues? No   Did the patient receive a copy of their discharge instructions? Yes   What is the patient's perception of their health status since discharge? Improving   Is the patient/caregiver able to teach back signs and symptoms related to disease process for when to call PCP? Yes   Is the patient/caregiver able to teach back signs and symptoms related to disease process for when to call 911? Yes   Is the patient/caregiver able to teach back the hierarchy of who to call/visit for symptoms/problems? PCP, Specialist, Home health nurse, Urgent Care, ED, 911 Yes   Week 2 Call Completed? Yes   Graduated Yes   Did the patient feel the follow up calls were helpful during their recovery period? Yes   Was the number of calls appropriate? Yes   Is the patient interested in additional calls from an ambulatory ? No   Would this patient benefit from a Referral to Amb Social Work? No   Graduated/Revoked comments Brief call, doing well, no questions.   Call end time 1429            Brandy DIETZ - Registered Nurse